# Patient Record
Sex: FEMALE | Race: WHITE | NOT HISPANIC OR LATINO | Employment: FULL TIME | URBAN - METROPOLITAN AREA
[De-identification: names, ages, dates, MRNs, and addresses within clinical notes are randomized per-mention and may not be internally consistent; named-entity substitution may affect disease eponyms.]

---

## 2017-03-06 RX ORDER — RIZATRIPTAN BENZOATE 10 MG/1
TABLET, ORALLY DISINTEGRATING ORAL
Qty: 12 TABLET | Refills: 0 | Status: SHIPPED | OUTPATIENT
Start: 2017-03-06 | End: 2017-03-10 | Stop reason: SDUPTHER

## 2017-03-09 ENCOUNTER — CONVERSION ENCOUNTER (OUTPATIENT)
Dept: FAMILY MEDICINE CLINIC | Facility: CLINIC | Age: 52
End: 2017-03-09

## 2017-03-09 LAB
ALBUMIN SERPL-MCNC: 4.5 G/DL (ref 3.6–5.1)
ALBUMIN/GLOB SERPL: ABNORMAL {RATIO} (ref 1–2.5)
ALP SERPL-CCNC: 120 UNITS/L (ref 33–130)
ALT SERPL-CCNC: 16 UNITS/L (ref 6–29)
AST SERPL-CCNC: 13 UNITS/L (ref 10–35)
BILIRUB SERPL-MCNC: 0.3 MG/DL (ref 0.2–1.2)
BUN SERPL-MCNC: 23 MG/DL (ref 7–25)
BUN/CREAT SERPL: ABNORMAL (ref 6–22)
CALCIUM SERPL-MCNC: 9.3 MG/DL (ref 8.6–10.4)
CHLORIDE SERPL-SCNC: 105 MMOL/L (ref 98–110)
CHOLEST SERPL-MCNC: 176 MG/DL (ref 125–200)
CHOLEST/HDLC SERPL: ABNORMAL {RATIO}
CO2 CONTENT VENOUS: 25 MMOL/L (ref 20–31)
CONV TOTAL PROTEIN: 7.2 G/DL (ref 6.1–8.1)
CREAT UR-MCNC: 1.14 MG/DL (ref 0.5–1.05)
GLOBULIN UR ELPH-MCNC: ABNORMAL G/DL (ref 1.9–3.7)
GLUCOSE SERPL-MCNC: 94 MG/DL (ref 65–99)
HDLC SERPL-MCNC: 40 MG/DL
LDLC SERPL CALC-MCNC: ABNORMAL MG/DL
POTASSIUM SERPL-SCNC: 4.3 MMOL/L (ref 3.5–5.3)
SODIUM SERPL-SCNC: 138 MMOL/L (ref 135–146)
TRIGL SERPL-MCNC: 456 MG/DL
TSH SERPL-ACNC: 1.05 MICROINTL UNITS/ML

## 2017-03-10 ENCOUNTER — OFFICE VISIT (OUTPATIENT)
Dept: NEUROLOGY | Facility: CLINIC | Age: 52
End: 2017-03-10

## 2017-03-10 VITALS
HEIGHT: 66 IN | SYSTOLIC BLOOD PRESSURE: 121 MMHG | DIASTOLIC BLOOD PRESSURE: 78 MMHG | BODY MASS INDEX: 28.93 KG/M2 | WEIGHT: 180 LBS

## 2017-03-10 DIAGNOSIS — G43.009 MIGRAINE WITHOUT AURA AND WITHOUT STATUS MIGRAINOSUS, NOT INTRACTABLE: Primary | ICD-10-CM

## 2017-03-10 PROCEDURE — 99213 OFFICE O/P EST LOW 20 MIN: CPT | Performed by: PSYCHIATRY & NEUROLOGY

## 2017-03-10 RX ORDER — RIZATRIPTAN BENZOATE 10 MG/1
10 TABLET, ORALLY DISINTEGRATING ORAL ONCE AS NEEDED
Qty: 12 TABLET | Refills: 11 | Status: SHIPPED | OUTPATIENT
Start: 2017-03-10 | End: 2020-06-01

## 2017-03-10 RX ORDER — TOPIRAMATE 50 MG/1
50 TABLET, FILM COATED ORAL NIGHTLY
Qty: 30 TABLET | Refills: 11 | Status: SHIPPED | OUTPATIENT
Start: 2017-03-10 | End: 2018-03-10

## 2017-03-10 RX ORDER — RIZATRIPTAN BENZOATE 10 MG/1
10 TABLET ORAL ONCE AS NEEDED
Qty: 12 TABLET | Refills: 11 | Status: SHIPPED | OUTPATIENT
Start: 2017-03-10 | End: 2023-02-16

## 2017-03-10 RX ORDER — RIZATRIPTAN BENZOATE 10 MG/1
10 TABLET, ORALLY DISINTEGRATING ORAL ONCE AS NEEDED
Qty: 9 TABLET | Refills: 11 | Status: SHIPPED | OUTPATIENT
Start: 2017-03-10 | End: 2017-03-10 | Stop reason: SDUPTHER

## 2017-03-10 NOTE — PROGRESS NOTES
Subjective:     Patient ID: Betty Dunham is a 51 y.o. female.    History of Present Illness     Patient's headaches have worsened in intensity and frequency.  Maxalt seems to work fairly well.  The following portions of the patient's history were reviewed and updated as appropriate: allergies, current medications, past family history, past medical history, past social history, past surgical history and problem list.      Current Outpatient Prescriptions:   •  rizatriptan MLT (MAXALT-MLT) 10 MG disintegrating tablet, Take 1 tablet by mouth 1 (One) Time As Needed for migraine for up to 1 dose. May repeat in 2 hours if needed, Disp: 12 tablet, Rfl: 11  •  rizatriptan (MAXALT) 10 MG tablet, Take 1 tablet by mouth 1 (One) Time As Needed for migraine. May repeat in 2 hours if needed, Disp: 12 tablet, Rfl: 11  •  topiramate (TOPAMAX) 50 MG tablet, Take 1 tablet by mouth Every Night., Disp: 30 tablet, Rfl: 11    Review of Systems   Constitutional: Negative.    Neurological: Positive for headaches. Negative for dizziness, tremors, seizures, syncope, facial asymmetry, speech difficulty, weakness, light-headedness and numbness.   Psychiatric/Behavioral: Negative.         Objective:    Neurologic Exam  Mental status examination was appropriate.  Funduscopy, visual fields, eye movements and pupillary reflexes were normal.  No facial weakness was noted.  Gait was normal.  No pattern of focal weakness was noted.  Physical Exam    Assessment/Plan:     Betty was seen today for migraine.    Diagnoses and all orders for this visit:    Migraine without aura and without status migrainosus, not intractable    Other orders  -     Discontinue: rizatriptan MLT (MAXALT-MLT) 10 MG disintegrating tablet; Take 1 tablet by mouth 1 (One) Time As Needed for migraine for up to 1 dose. May repeat in 2 hours if needed  -     topiramate (TOPAMAX) 50 MG tablet; Take 1 tablet by mouth Every Night.  -     rizatriptan MLT (MAXALT-MLT) 10 MG  disintegrating tablet; Take 1 tablet by mouth 1 (One) Time As Needed for migraine for up to 1 dose. May repeat in 2 hours if needed  -     rizatriptan (MAXALT) 10 MG tablet; Take 1 tablet by mouth 1 (One) Time As Needed for migraine. May repeat in 2 hours if needed       Prescription drug management - change from Maxalt MLT to regular Maxalt 10 mg.  As her headaches are worse added Topamax 50 mg at night.  She's been on this in the past.    Phone follow-up in one month.  Follow-up in the office in one year.Thank you for allowing me to share in the care of this patient.  Marco A Ron M.D.

## 2018-04-10 RX ORDER — RIZATRIPTAN BENZOATE 10 MG/1
TABLET, ORALLY DISINTEGRATING ORAL
Qty: 12 TABLET | OUTPATIENT
Start: 2018-04-10

## 2019-04-16 ENCOUNTER — HOSPITAL ENCOUNTER (OUTPATIENT)
Dept: FAMILY MEDICINE CLINIC | Facility: CLINIC | Age: 54
Setting detail: SPECIMEN
Discharge: HOME | End: 2019-04-16
Attending: INTERNAL MEDICINE | Admitting: INTERNAL MEDICINE

## 2019-04-16 LAB
ALBUMIN SERPL-MCNC: 4.3 G/DL (ref 3.5–4.8)
ALBUMIN/GLOB SERPL: 1.5 {RATIO} (ref 1–1.7)
ALP SERPL-CCNC: 92 IU/L (ref 32–91)
ALT SERPL-CCNC: 23 IU/L (ref 14–54)
ANION GAP SERPL CALC-SCNC: 16.7 MMOL/L (ref 10–20)
AST SERPL-CCNC: 20 IU/L (ref 15–41)
BASOPHILS # BLD AUTO: 0 10*3/UL (ref 0–0.2)
BASOPHILS NFR BLD AUTO: 1 % (ref 0–2)
BILIRUB SERPL-MCNC: 1 MG/DL (ref 0.3–1.2)
BUN SERPL-MCNC: 19 MG/DL (ref 8–20)
BUN/CREAT SERPL: 13.6 (ref 5.4–26.2)
CALCIUM SERPL-MCNC: 9.3 MG/DL (ref 8.9–10.3)
CHLORIDE SERPL-SCNC: 104 MMOL/L (ref 101–111)
CHOLEST SERPL-MCNC: 150 MG/DL
CHOLEST/HDLC SERPL: 3.3 {RATIO}
CONV CO2: 23 MMOL/L (ref 22–32)
CONV LDL CHOLESTEROL DIRECT: 81 MG/DL (ref 0–100)
CONV TOTAL PROTEIN: 7.1 G/DL (ref 6.1–7.9)
CREAT UR-MCNC: 1.4 MG/DL (ref 0.4–1)
DIFFERENTIAL METHOD BLD: (no result)
EOSINOPHIL # BLD AUTO: 0.2 10*3/UL (ref 0–0.3)
EOSINOPHIL # BLD AUTO: 3 % (ref 0–3)
ERYTHROCYTE [DISTWIDTH] IN BLOOD BY AUTOMATED COUNT: 12.8 % (ref 11.5–14.5)
GLOBULIN UR ELPH-MCNC: 2.8 G/DL (ref 2.5–3.8)
GLUCOSE SERPL-MCNC: 118 MG/DL (ref 65–99)
HCT VFR BLD AUTO: 42.7 % (ref 35–49)
HDLC SERPL-MCNC: 46 MG/DL
HGB BLD-MCNC: 14.3 G/DL (ref 12–15)
LDLC/HDLC SERPL: 1.8 {RATIO}
LIPID INTERPRETATION: ABNORMAL
LYMPHOCYTES # BLD AUTO: 1.8 10*3/UL (ref 0.8–4.8)
LYMPHOCYTES NFR BLD AUTO: 35 % (ref 18–42)
MCH RBC QN AUTO: 30.4 PG (ref 26–32)
MCHC RBC AUTO-ENTMCNC: 33.4 G/DL (ref 32–36)
MCV RBC AUTO: 90.8 FL (ref 80–94)
MONOCYTES # BLD AUTO: 0.3 10*3/UL (ref 0.1–1.3)
MONOCYTES NFR BLD AUTO: 5 % (ref 2–11)
NEUTROPHILS # BLD AUTO: 3 10*3/UL (ref 2.3–8.6)
NEUTROPHILS NFR BLD AUTO: 56 % (ref 50–75)
NRBC BLD AUTO-RTO: 0 /100{WBCS}
NRBC/RBC NFR BLD MANUAL: 0 10*3/UL
PLATELET # BLD AUTO: 113 10*3/UL (ref 150–450)
PMV BLD AUTO: 8.9 FL (ref 7.4–10.4)
POTASSIUM SERPL-SCNC: 4.7 MMOL/L (ref 3.6–5.1)
RBC # BLD AUTO: 4.7 10*6/UL (ref 4–5.4)
SODIUM SERPL-SCNC: 139 MMOL/L (ref 136–144)
TRIGL SERPL-MCNC: 128 MG/DL
VLDLC SERPL CALC-MCNC: 23.1 MG/DL
WBC # BLD AUTO: 5.3 10*3/UL (ref 4.5–11.5)

## 2019-12-10 RX ORDER — GUAIFENESIN 600 MG/1
1200 TABLET, EXTENDED RELEASE ORAL 2 TIMES DAILY
COMMUNITY
End: 2020-06-26

## 2019-12-10 RX ORDER — AMOXICILLIN AND CLAVULANATE POTASSIUM 875; 125 MG/1; MG/1
1 TABLET, FILM COATED ORAL 2 TIMES DAILY
COMMUNITY
End: 2020-06-26

## 2019-12-13 ENCOUNTER — ANESTHESIA EVENT (OUTPATIENT)
Dept: GASTROENTEROLOGY | Facility: HOSPITAL | Age: 54
End: 2019-12-13

## 2019-12-16 ENCOUNTER — ANESTHESIA (OUTPATIENT)
Dept: GASTROENTEROLOGY | Facility: HOSPITAL | Age: 54
End: 2019-12-16

## 2019-12-16 ENCOUNTER — HOSPITAL ENCOUNTER (OUTPATIENT)
Facility: HOSPITAL | Age: 54
Setting detail: HOSPITAL OUTPATIENT SURGERY
Discharge: HOME | End: 2019-12-16
Attending: INTERNAL MEDICINE | Admitting: INTERNAL MEDICINE

## 2019-12-16 ENCOUNTER — ON CAMPUS - OUTPATIENT (OUTPATIENT)
Dept: URBAN - METROPOLITAN AREA HOSPITAL 85 | Facility: HOSPITAL | Age: 54
End: 2019-12-16
Payer: COMMERCIAL

## 2019-12-16 VITALS
DIASTOLIC BLOOD PRESSURE: 79 MMHG | HEART RATE: 75 BPM | HEIGHT: 65 IN | RESPIRATION RATE: 20 BRPM | SYSTOLIC BLOOD PRESSURE: 120 MMHG | OXYGEN SATURATION: 100 % | WEIGHT: 199.52 LBS | BODY MASS INDEX: 33.24 KG/M2 | TEMPERATURE: 97.9 F

## 2019-12-16 DIAGNOSIS — D12.3 BENIGN NEOPLASM OF TRANSVERSE COLON: ICD-10-CM

## 2019-12-16 DIAGNOSIS — Z12.11 SCREENING FOR COLON CANCER: ICD-10-CM

## 2019-12-16 DIAGNOSIS — Z12.11 ENCOUNTER FOR SCREENING FOR MALIGNANT NEOPLASM OF COLON: ICD-10-CM

## 2019-12-16 PROCEDURE — 25010000002 ONDANSETRON PER 1 MG: Performed by: INTERNAL MEDICINE

## 2019-12-16 PROCEDURE — 25010000002 PROPOFOL 10 MG/ML EMULSION: Performed by: ANESTHESIOLOGY

## 2019-12-16 PROCEDURE — 88305 TISSUE EXAM BY PATHOLOGIST: CPT | Performed by: INTERNAL MEDICINE

## 2019-12-16 PROCEDURE — 45385 COLONOSCOPY W/LESION REMOVAL: CPT | Performed by: INTERNAL MEDICINE

## 2019-12-16 RX ORDER — SODIUM CHLORIDE 0.9 % (FLUSH) 0.9 %
10 SYRINGE (ML) INJECTION AS NEEDED
Status: CANCELLED | OUTPATIENT
Start: 2019-12-16

## 2019-12-16 RX ORDER — SODIUM CHLORIDE 9 MG/ML
9 INJECTION, SOLUTION INTRAVENOUS CONTINUOUS PRN
Status: DISCONTINUED | OUTPATIENT
Start: 2019-12-16 | End: 2019-12-16 | Stop reason: HOSPADM

## 2019-12-16 RX ORDER — SODIUM CHLORIDE 0.9 % (FLUSH) 0.9 %
10 SYRINGE (ML) INJECTION EVERY 12 HOURS SCHEDULED
Status: DISCONTINUED | OUTPATIENT
Start: 2019-12-16 | End: 2019-12-16 | Stop reason: HOSPADM

## 2019-12-16 RX ORDER — LIDOCAINE HYDROCHLORIDE 10 MG/ML
INJECTION, SOLUTION EPIDURAL; INFILTRATION; INTRACAUDAL; PERINEURAL AS NEEDED
Status: DISCONTINUED | OUTPATIENT
Start: 2019-12-16 | End: 2019-12-16 | Stop reason: SURG

## 2019-12-16 RX ORDER — SODIUM CHLORIDE 0.9 % (FLUSH) 0.9 %
10 SYRINGE (ML) INJECTION AS NEEDED
Status: DISCONTINUED | OUTPATIENT
Start: 2019-12-16 | End: 2019-12-16 | Stop reason: HOSPADM

## 2019-12-16 RX ORDER — ONDANSETRON 2 MG/ML
4 INJECTION INTRAMUSCULAR; INTRAVENOUS ONCE AS NEEDED
Status: COMPLETED | OUTPATIENT
Start: 2019-12-16 | End: 2019-12-16

## 2019-12-16 RX ORDER — ONDANSETRON 2 MG/ML
4 INJECTION INTRAMUSCULAR; INTRAVENOUS EVERY 6 HOURS PRN
Status: CANCELLED | OUTPATIENT
Start: 2019-12-16

## 2019-12-16 RX ORDER — ONDANSETRON 4 MG/1
4 TABLET, FILM COATED ORAL EVERY 6 HOURS PRN
Status: CANCELLED | OUTPATIENT
Start: 2019-12-16

## 2019-12-16 RX ORDER — MEPERIDINE HYDROCHLORIDE 25 MG/ML
50 INJECTION INTRAMUSCULAR; INTRAVENOUS; SUBCUTANEOUS EVERY 6 HOURS PRN
Status: DISCONTINUED | OUTPATIENT
Start: 2019-12-16 | End: 2019-12-16 | Stop reason: HOSPADM

## 2019-12-16 RX ORDER — MAGNESIUM CARB/ALUMINUM HYDROX 105-160MG
296 TABLET,CHEWABLE ORAL ONCE
Status: DISCONTINUED | OUTPATIENT
Start: 2019-12-16 | End: 2019-12-16 | Stop reason: HOSPADM

## 2019-12-16 RX ORDER — SODIUM CHLORIDE 0.9 % (FLUSH) 0.9 %
3 SYRINGE (ML) INJECTION EVERY 12 HOURS SCHEDULED
Status: CANCELLED | OUTPATIENT
Start: 2019-12-16

## 2019-12-16 RX ORDER — PROPOFOL 10 MG/ML
VIAL (ML) INTRAVENOUS AS NEEDED
Status: DISCONTINUED | OUTPATIENT
Start: 2019-12-16 | End: 2019-12-16 | Stop reason: SURG

## 2019-12-16 RX ADMIN — PROPOFOL 30 MG: 10 INJECTION, EMULSION INTRAVENOUS at 10:37

## 2019-12-16 RX ADMIN — ONDANSETRON 8 MG: 2 INJECTION INTRAMUSCULAR; INTRAVENOUS at 11:14

## 2019-12-16 RX ADMIN — PROPOFOL 40 MG: 10 INJECTION, EMULSION INTRAVENOUS at 10:24

## 2019-12-16 RX ADMIN — MEPERIDINE HYDROCHLORIDE 25 MG: 25 INJECTION INTRAMUSCULAR; INTRAVENOUS; SUBCUTANEOUS at 11:43

## 2019-12-16 RX ADMIN — SODIUM CHLORIDE 9 ML/HR: 900 INJECTION, SOLUTION INTRAVENOUS at 10:12

## 2019-12-16 RX ADMIN — PROPOFOL 40 MG: 10 INJECTION, EMULSION INTRAVENOUS at 10:30

## 2019-12-16 RX ADMIN — PROPOFOL 100 MG: 10 INJECTION, EMULSION INTRAVENOUS at 10:22

## 2019-12-16 RX ADMIN — PROPOFOL 40 MG: 10 INJECTION, EMULSION INTRAVENOUS at 10:25

## 2019-12-16 RX ADMIN — PROPOFOL 40 MG: 10 INJECTION, EMULSION INTRAVENOUS at 10:34

## 2019-12-16 RX ADMIN — LIDOCAINE HYDROCHLORIDE 40 MG: 10 INJECTION, SOLUTION EPIDURAL; INFILTRATION; INTRACAUDAL; PERINEURAL at 10:22

## 2019-12-16 RX ADMIN — PROPOFOL 40 MG: 10 INJECTION, EMULSION INTRAVENOUS at 10:28

## 2019-12-16 RX ADMIN — PROPOFOL 40 MG: 10 INJECTION, EMULSION INTRAVENOUS at 10:23

## 2019-12-16 NOTE — NURSING NOTE
Pt sats dropped to 88-89 while dozing. States has sleep apnea. Applied oxygen per nasal cannula at 2 liters as per standing orders. Pts sats jose maria to 96%. Resting at intervals.

## 2019-12-16 NOTE — H&P
GI CONSULT  NOTE:    Referring Provider:    Sofia Whipple MD  [unfilled]    Chief complaint: <principal problem not specified>    Subjective .   Screening colonoscopy  History of present illness:      Betty Dunham is a 54 y.o. female who presents today for Procedure(s):  COLONOSCOPY for the indications listed below.     The updated Patient Profile was reviewed prior to the procedure, in conjunction with the Physical Exam, including medical conditions, surgical procedures, medications, allergies, family history and social history.     Pre-operatively, I reviewed the indication(s) for the procedure, the risks of the procedure [including but not limited to: unexpected bleeding possibly requiring hospitalization and/or unplanned repeat procedures, perforation possibly requiring surgical treatment, missed lesions and complications of sedation/MAC (also explained by anesthesia staff)].     I have evaluated the patient for risks associated with the planned anesthesia and the procedure to be performed and find the patient an acceptable candidate for IV sedation.    Multiple opportunities were provided for any questions or concerns, and all questions were answered satisfactorily before any anesthesia was administered. We will proceed with the planned procedure.    Past Medical History:  Past Medical History:   Diagnosis Date   • Arthritis    • Migraine    • Sleep apnea        Past Surgical History:  Past Surgical History:   Procedure Laterality Date   • APPENDECTOMY     •  SECTION     • HYSTERECTOMY     • KNEE ARTHROSCOPY Right    • NO PAST SURGERIES         Social History:  Social History     Tobacco Use   • Smoking status: Former Smoker   Substance Use Topics   • Alcohol use: Yes     Comment: 3 times per year   • Drug use: No       Family History:  Family History   Problem Relation Age of Onset   • Parkinsonism Father        Medications:  No medications prior to admission.       Scheduled  "Meds:  Continuous Infusions:  No current facility-administered medications for this encounter.   PRN Meds:.    ALLERGIES:  Patient has no known allergies.    ROS:  The following systems were reviewed and negative;   Constitution:  No fevers, chills, no unintentional weight loss  Skin: no rash, no jaundice  Eyes:  No blurry vision, no eye pain  HENT:  No change in hearing or smell  Resp:  No dyspnea or cough  CV:  No chest pain or palpitations  :  No dysuria, hematuria  Musculoskeletal:  No leg cramps or arthralgias  Neuro:  No tremor, no numbness  Psych:  No depression or confsuion    Objective     Vital Signs:   Vitals:    12/10/19 1617   Weight: 81.6 kg (180 lb)   Height: 165.1 cm (65\")       Physical Exam:       General Appearance:    Awake and alert, in no acute distress   Head:    Normocephalic, without obvious abnormality, atraumatic   Throat:   No oral lesions, no thrush, oral mucosa moist   Lungs:     respirations regular, even and unlabored   Skin:   No rash, no jaundice       Results Review:  Lab Results (last 24 hours)     ** No results found for the last 24 hours. **          Imaging Results (Last 24 Hours)     ** No results found for the last 24 hours. **           I reviewed the patient's labs and imaging.    ASSESSMENT AND PLAN:      Active Problems:    * No active hospital problems. *  Screening colonoscopy    Procedure(s):  COLONOSCOPY      I discussed the patients findings and my recommendations with the patient.    Dominic Cameron MD  12/16/19  8:18 AM  "

## 2019-12-16 NOTE — NURSING NOTE
Pt complaining of nausea and abd pain. Informed dr Gutiérrez who spoke with pt and ordered 8 mg iv zofran which was given. Pt states nausea lessened but pain still present. Notified Dr Murray

## 2019-12-16 NOTE — NURSING NOTE
Pt states pain is much better, about a 1. Discharge instructions given, pt nausea has abated as well. Discharged home with son.

## 2019-12-16 NOTE — NURSING NOTE
"Assisted pt to walk half of hallway and then to bathroom where she was able to pass \"a little gas\". Stated pain still at 6-7. Dr Murray notified. Dr Murray ordered to start with 25 mg demerol and if no relief in 30 minutes to administer 25 mg more.  Given 25 mg iv and flushed after, good blood return. Pt stating she is getting relief after 5 minutes.  "

## 2019-12-16 NOTE — DISCHARGE INSTRUCTIONS
A responsible adult should stay with you and you should rest quietly for the rest of the day.    Do not drink alcohol, drive, operate any heavy machinery or power tools or make any legal/important decisions for the next 24 hours.     Progress your diet as tolerated.  If you begin to experience severe pain, increased shortness of breath, racing heartbeat or a fever above 101 F, seek immediate medical attention.     You had polyps removed. You may see a few drops of blood but if bleeding is more than a teaspoonful go to the emergency room.    Follow up with MD as instructed. Call office for results in 3 to 5 days if needed.

## 2019-12-16 NOTE — ANESTHESIA POSTPROCEDURE EVALUATION
Patient: Betty Dunham    Procedure Summary     Date:  12/16/19 Room / Location:  [facility] ENDOSCOPY 4 / [facility] ENDOSCOPY    Anesthesia Start:  1018 Anesthesia Stop:  1052    Procedure:  COLONOSCOPY with polypectomy x2 (N/A ) Diagnosis:  (Colon Cancer Screening)    Surgeon:  Dominic Cameron MD Provider:  Yehuda Miller MD    Anesthesia Type:  MAC ASA Status:  2          Anesthesia Type: MAC    Vitals  Vitals Value Taken Time   /76 12/16/2019 11:23 AM   Temp     Pulse 70 12/16/2019 11:26 AM   Resp 12 12/16/2019 11:23 AM   SpO2 100 % 12/16/2019 11:26 AM   Vitals shown include unvalidated device data.        Post Anesthesia Care and Evaluation    Patient location during evaluation: PACU  Patient participation: complete - patient participated  Level of consciousness: awake  Pain scale: See nurse's notes for pain score.  Pain management: adequate  Airway patency: patent  Anesthetic complications: No anesthetic complications  PONV Status: none  Cardiovascular status: acceptable  Respiratory status: acceptable  Hydration status: acceptable    Comments: Patient seen and examined postoperatively; vital signs stable; SpO2 greater than or equal to 90%; cardiopulmonary status stable; nausea/vomiting adequately controlled; pain adequately controlled; no apparent anesthesia complications; patient discharged from anesthesia care when discharge criteria were met

## 2019-12-16 NOTE — OP NOTE
COLONOSCOPY Procedure Report    Patient Name:  Betty Dunham  YOB: 1965    Date of Surgery:  12/16/2019     Pre-Op Diagnosis:  Colon Cancer Screening       Post-Op Diagnosis Codes:  2 polyps removed via cold snare polypectomy in the transverse colon      Procedure/CPT® Codes:      Procedure(s):  COLONOSCOPY with polypectomy x2    Staff:  Surgeon(s):  Dominic Cameron MD      Anesthesia: Monitored Anesthesia Care    Description of Procedure:  A description of the procedure as well as risks, benefits and alternative methods were explained to the patient who voiced understanding and signed the corresponding consent form. A physical exam was performed and vital signs were monitored throughout the procedure.    A rectal exam was performed which was normal. An Olympus colonoscope was placed into the rectum and proceeded under direct visualization through the colon until the cecum and appendiceal orifice were identified. Careful visualization occurred upon slow withdraw of the scope. The scope was then retroflexed and the distal rectum was visualized. The quality of the prep was good. The procedure was moderately difficult due to loop formation and there were no immediate complications.    Findings:  2 polyps removed via cold snare polypectomy in the transverse colon    Impression:  Screening colonoscopy with 2 polyps removed    Recommendations:  Follow-up biopsy results  Repeat colonoscopy in 5 years if polyps are adenomatous otherwise 10 years      Dominic Cameron MD     Date: 12/16/2019    Time: 11:00 AM

## 2019-12-16 NOTE — ANESTHESIA PREPROCEDURE EVALUATION
Anesthesia Evaluation     Patient summary reviewed   NPO Solid Status: > 8 hours  NPO Liquid Status: > 8 hours           Airway   Mallampati: II  TM distance: >3 FB  Neck ROM: full  No difficulty expected  Dental - normal exam     Pulmonary - normal exam   (+) sleep apnea,   Cardiovascular - normal exam  Exercise tolerance: good (4-7 METS)    ECG reviewed        Neuro/Psych  (+) headaches,     GI/Hepatic/Renal/Endo      Musculoskeletal     Abdominal  - normal exam    Bowel sounds: normal.   Substance History      OB/GYN          Other   arthritis,                      Anesthesia Plan    ASA 2     MAC     intravenous induction     Anesthetic plan, all risks, benefits, and alternatives have been provided, discussed and informed consent has been obtained with: patient.

## 2019-12-17 LAB
LAB AP CASE REPORT: NORMAL
PATH REPORT.FINAL DX SPEC: NORMAL
PATH REPORT.GROSS SPEC: NORMAL

## 2020-04-27 RX ORDER — CELECOXIB 200 MG/1
CAPSULE ORAL
Qty: 30 CAPSULE | Refills: 7 | Status: SHIPPED | OUTPATIENT
Start: 2020-04-27 | End: 2020-07-21

## 2020-06-01 RX ORDER — RIZATRIPTAN BENZOATE 10 MG/1
TABLET, ORALLY DISINTEGRATING ORAL
Qty: 12 TABLET | Refills: 12 | Status: SHIPPED | OUTPATIENT
Start: 2020-06-01 | End: 2020-06-26 | Stop reason: SDUPTHER

## 2020-06-26 ENCOUNTER — OFFICE VISIT (OUTPATIENT)
Dept: FAMILY MEDICINE CLINIC | Facility: CLINIC | Age: 55
End: 2020-06-26

## 2020-06-26 VITALS
SYSTOLIC BLOOD PRESSURE: 131 MMHG | DIASTOLIC BLOOD PRESSURE: 83 MMHG | WEIGHT: 212.6 LBS | HEART RATE: 94 BPM | RESPIRATION RATE: 12 BRPM | BODY MASS INDEX: 35.38 KG/M2 | TEMPERATURE: 98.2 F

## 2020-06-26 DIAGNOSIS — Z12.39 SCREENING FOR BREAST CANCER: ICD-10-CM

## 2020-06-26 DIAGNOSIS — M54.50 ACUTE MIDLINE LOW BACK PAIN WITHOUT SCIATICA: Primary | ICD-10-CM

## 2020-06-26 DIAGNOSIS — G43.009 MIGRAINE WITHOUT AURA AND WITHOUT STATUS MIGRAINOSUS, NOT INTRACTABLE: ICD-10-CM

## 2020-06-26 LAB
BILIRUB BLD-MCNC: NEGATIVE MG/DL
CLARITY, POC: CLEAR
COLOR UR: YELLOW
GLUCOSE UR STRIP-MCNC: NEGATIVE MG/DL
KETONES UR QL: NEGATIVE
LEUKOCYTE EST, POC: NEGATIVE
NITRITE UR-MCNC: NEGATIVE MG/ML
PH UR: 5.5 [PH] (ref 5–8)
PROT UR STRIP-MCNC: NEGATIVE MG/DL
RBC # UR STRIP: NEGATIVE /UL
SP GR UR: 1.01 (ref 1–1.03)
UROBILINOGEN UR QL: NORMAL

## 2020-06-26 PROCEDURE — 96372 THER/PROPH/DIAG INJ SC/IM: CPT | Performed by: INTERNAL MEDICINE

## 2020-06-26 PROCEDURE — 81003 URINALYSIS AUTO W/O SCOPE: CPT | Performed by: INTERNAL MEDICINE

## 2020-06-26 PROCEDURE — 99214 OFFICE O/P EST MOD 30 MIN: CPT | Performed by: INTERNAL MEDICINE

## 2020-06-26 RX ORDER — RIZATRIPTAN BENZOATE 10 MG/1
10 TABLET, ORALLY DISINTEGRATING ORAL ONCE AS NEEDED
Qty: 12 TABLET | Refills: 12 | Status: SHIPPED | OUTPATIENT
Start: 2020-06-26 | End: 2021-07-16

## 2020-06-26 RX ORDER — METHYLPREDNISOLONE ACETATE 80 MG/ML
80 INJECTION, SUSPENSION INTRA-ARTICULAR; INTRALESIONAL; INTRAMUSCULAR; SOFT TISSUE ONCE
Status: COMPLETED | OUTPATIENT
Start: 2020-06-26 | End: 2020-06-26

## 2020-06-26 RX ADMIN — METHYLPREDNISOLONE ACETATE 80 MG: 80 INJECTION, SUSPENSION INTRA-ARTICULAR; INTRALESIONAL; INTRAMUSCULAR; SOFT TISSUE at 17:27

## 2020-06-26 NOTE — PROGRESS NOTES
Rooming Tab(CC,VS,Pt Hx,Fall Screen)  Chief Complaint   Patient presents with   • Back Pain       Subjective   Pt here for lower back--  Worried that it is UTI been on horse every day for last week   Knee getting worse is on celebrex and getting shots- very stiff see ortho regularly- doesn't want replacement yet  needs refill  On maxalt , needs mammogram and asking if could just get physical today    I have reviewed and updated her medications, medical history and problem list during today's office visit.     Patient Care Team:  Sofia Whipple MD as PCP - General    Problem List Tab  Medications Tab  Synopsis Tab  Chart Review Tab  Care Everywhere Tab  Immunizations Tab  Patient History Tab    Social History     Tobacco Use   • Smoking status: Former Smoker   Substance Use Topics   • Alcohol use: Yes     Comment: 3 times per year       Review of Systems   Constitutional: Negative for fatigue and fever.   HENT: Negative for congestion.    Respiratory: Negative for apnea, cough and wheezing.    Cardiovascular: Negative for chest pain.   Gastrointestinal: Negative for abdominal distention.   Musculoskeletal: Positive for arthralgias, back pain and gait problem.   Neurological: Positive for headache. Negative for syncope.   Psychiatric/Behavioral: Positive for sleep disturbance. Negative for behavioral problems.       Objective     Rooming Tab(CC,VS,Pt Hx,Fall Screen)  /83 (BP Location: Left arm, Patient Position: Sitting, Cuff Size: Large Adult)   Pulse 94   Temp 98.2 °F (36.8 °C) (Tympanic)   Resp 12   Wt 96.4 kg (212 lb 9.6 oz)   BMI 35.38 kg/m²     Body mass index is 35.38 kg/m².    Physical Exam   Constitutional: She is oriented to person, place, and time. She appears well-developed and well-nourished.   HENT:   Head: Normocephalic and atraumatic.   Right Ear: Tympanic membrane normal.   Left Ear: Tympanic membrane normal.   Eyes: Pupils are equal, round, and reactive to light.   Neck: Normal  range of motion. Neck supple.   Cardiovascular: Normal rate and regular rhythm.   No murmur heard.  Pulmonary/Chest: Effort normal and breath sounds normal.   Abdominal: Soft. Bowel sounds are normal. She exhibits no distension.   Musculoskeletal: She exhibits tenderness.   Lumbar diffuse tenderness- negative straight leg   Neurological: She is oriented to person, place, and time.   Skin: Capillary refill takes less than 2 seconds.   Psychiatric: She has a normal mood and affect.   Nursing note and vitals reviewed.       Statin Choice Calculator  Data Reviewed:                   Assessment/Plan   Order Review Tab  Health Maintenance Tab  Patient Plan/Order Tab  Diagnoses and all orders for this visit:    1. Encounter for general adult medical examination without abnormal findings (Primary)  Assessment & Plan:  Discussed all recommendations      2. Migraine without aura and without status migrainosus, not intractable  -     rizatriptan MLT (MAXALT-MLT) 10 MG disintegrating tablet; Place 1 tablet on the tongue 1 (One) Time As Needed for Migraine for up to 1 dose. May repeat in 2 hours if needed  Dispense: 12 tablet; Refill: 12    3. Screening for breast cancer  Comments:  mammogram order sent    Orders:  -     Mammo Screening Digital Tomosynthesis Bilateral With CAD; Future    4. Acute midline low back pain without sciatica  Comments:  tolerated injection well- will change from celebrex if not helping  Orders:  -     methylPREDNISolone acetate (DEPO-medrol) injection 80 mg  -     POCT urinalysis dipstick, multipro      Wrapup Tab  Return if symptoms worsen or fail to improve, for Annual physical.      During this visit for their annual exam, we reviewed their personal history, social history and family history.  We went over their medications and all the recommended health maintenence items for their age group. They were given the opportunity to ask questions and discuss other concerns.

## 2020-06-27 PROBLEM — Z12.39 SCREENING FOR BREAST CANCER: Status: ACTIVE | Noted: 2020-06-27

## 2020-06-27 PROBLEM — M54.50 ACUTE MIDLINE LOW BACK PAIN WITHOUT SCIATICA: Status: ACTIVE | Noted: 2020-06-27

## 2020-06-27 PROBLEM — M15.9 POLYOSTEOARTHRITIS: Status: ACTIVE | Noted: 2019-04-16

## 2020-06-27 PROBLEM — R92.8 ABNORMAL MAMMOGRAM: Status: ACTIVE | Noted: 2019-06-03

## 2020-06-27 PROBLEM — Z00.00 ENCOUNTER FOR GENERAL ADULT MEDICAL EXAMINATION WITHOUT ABNORMAL FINDINGS: Status: ACTIVE | Noted: 2017-03-08

## 2020-07-21 ENCOUNTER — LAB (OUTPATIENT)
Dept: FAMILY MEDICINE CLINIC | Facility: CLINIC | Age: 55
End: 2020-07-21

## 2020-07-21 ENCOUNTER — OFFICE VISIT (OUTPATIENT)
Dept: FAMILY MEDICINE CLINIC | Facility: CLINIC | Age: 55
End: 2020-07-21

## 2020-07-21 VITALS
SYSTOLIC BLOOD PRESSURE: 130 MMHG | HEIGHT: 65 IN | TEMPERATURE: 98 F | HEART RATE: 77 BPM | DIASTOLIC BLOOD PRESSURE: 70 MMHG | RESPIRATION RATE: 16 BRPM | OXYGEN SATURATION: 99 % | BODY MASS INDEX: 35.49 KG/M2 | WEIGHT: 213 LBS

## 2020-07-21 DIAGNOSIS — M15.9 PRIMARY OSTEOARTHRITIS INVOLVING MULTIPLE JOINTS: ICD-10-CM

## 2020-07-21 DIAGNOSIS — E55.9 VITAMIN D DEFICIENCY: ICD-10-CM

## 2020-07-21 DIAGNOSIS — Z00.00 ENCOUNTER FOR GENERAL ADULT MEDICAL EXAMINATION WITHOUT ABNORMAL FINDINGS: Primary | ICD-10-CM

## 2020-07-21 DIAGNOSIS — G43.009 MIGRAINE WITHOUT AURA AND WITHOUT STATUS MIGRAINOSUS, NOT INTRACTABLE: ICD-10-CM

## 2020-07-21 PROBLEM — H43.399 VITREOUS FLOATERS: Status: ACTIVE | Noted: 2017-02-10

## 2020-07-21 PROCEDURE — 85025 COMPLETE CBC W/AUTO DIFF WBC: CPT | Performed by: INTERNAL MEDICINE

## 2020-07-21 PROCEDURE — 82306 VITAMIN D 25 HYDROXY: CPT | Performed by: INTERNAL MEDICINE

## 2020-07-21 PROCEDURE — 80061 LIPID PANEL: CPT | Performed by: INTERNAL MEDICINE

## 2020-07-21 PROCEDURE — 80053 COMPREHEN METABOLIC PANEL: CPT | Performed by: INTERNAL MEDICINE

## 2020-07-21 PROCEDURE — 99396 PREV VISIT EST AGE 40-64: CPT | Performed by: INTERNAL MEDICINE

## 2020-07-21 RX ORDER — DICLOFENAC SODIUM 75 MG/1
75 TABLET, DELAYED RELEASE ORAL 2 TIMES DAILY
Qty: 60 TABLET | Refills: 2 | Status: SHIPPED | OUTPATIENT
Start: 2020-07-21 | End: 2020-09-19

## 2020-07-21 RX ORDER — ERGOCALCIFEROL 1.25 MG/1
50000 CAPSULE ORAL WEEKLY
COMMUNITY
End: 2020-09-01

## 2020-07-21 NOTE — PROGRESS NOTES
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  OA getting worse and sees ortho and gets shots- when got shot in June for back helped knees greatly and still ok- stopped celebrex.      I have reviewed and updated her medications, medical history and problem list during today's office visit.     Patient Care Team:  Sofia Whipple MD as PCP - General    Problem List Tab  Medications Tab  Synopsis Tab  Chart Review Tab  Care Everywhere Tab  Immunizations Tab  Patient History Tab    Social History     Tobacco Use   • Smoking status: Former Smoker   • Smokeless tobacco: Never Used   • Tobacco comment: 24 yrs ago   Substance Use Topics   • Alcohol use: Yes     Comment: 3 times per year       Review of Systems   Constitutional: Negative for fatigue and fever.   HENT: Negative for congestion.    Respiratory: Negative for apnea, cough and wheezing.    Cardiovascular: Negative for chest pain.   Gastrointestinal: Negative for abdominal distention.   Musculoskeletal: Positive for arthralgias. Negative for gait problem.   Neurological: Negative for syncope.   Psychiatric/Behavioral: Positive for stress. Negative for behavioral problems and sleep disturbance.       Objective     Rooming Tab(CC,VS,Pt Hx,Fall Screen)  /70 (BP Location: Left arm, Patient Position: Sitting, Cuff Size: Large Adult)   Pulse 77   Temp 98 °F (36.7 °C) (Temporal)   Resp 16   Ht 165.1 cm (65\")   Wt 96.6 kg (213 lb)   SpO2 99%   BMI 35.45 kg/m²     Body mass index is 35.45 kg/m².    Physical Exam   Constitutional: She is oriented to person, place, and time. She appears well-developed and well-nourished.   HENT:   Head: Normocephalic and atraumatic.   Right Ear: Tympanic membrane normal.   Left Ear: Tympanic membrane normal.   Eyes: Pupils are equal, round, " and reactive to light.   Neck: Normal range of motion. Neck supple.   Cardiovascular: Normal rate and regular rhythm.   No murmur heard.  Pulmonary/Chest: Effort normal and breath sounds normal.   Abdominal: Soft. Bowel sounds are normal. She exhibits no distension.   Musculoskeletal:   Mild OA in knees   Neurological: She is oriented to person, place, and time.   Skin: Capillary refill takes less than 2 seconds.   Psychiatric: She has a normal mood and affect.   Nursing note and vitals reviewed.       Statin Choice Calculator  Data Reviewed:               Lab Results   Component Value Date    BUN 23 (H) 07/21/2020    CREATININE 1.23 (H) 07/21/2020    EGFRIFNONA 45 (L) 07/21/2020     07/21/2020    K 4.1 07/21/2020     07/21/2020    CALCIUM 9.5 07/21/2020    ALBUMIN 4.30 07/21/2020    BILITOT 0.2 07/21/2020    ALKPHOS 99 07/21/2020    AST 15 07/21/2020    ALT 18 07/21/2020    TRIG 156 (H) 07/21/2020    HDL 54 07/21/2020    VLDL 31.2 07/21/2020    LDL 94 07/21/2020    LDLHDL 1.74 07/21/2020    WBC 6.74 07/21/2020    RBC 4.33 07/21/2020    HCT 39.6 07/21/2020    MCV 91.5 07/21/2020    MCH 29.8 07/21/2020    DSYB78QA 55.7 07/21/2020      Assessment/Plan   Order Review Tab  Health Maintenance Tab  Patient Plan/Order Tab  Diagnoses and all orders for this visit:    1. Encounter for general adult medical examination without abnormal findings (Primary)  Comments:   discussed all reocmmendations  does not want vaccines today  Orders:  -     Lipid Panel  -     Comprehensive Metabolic Panel  -     CBC Auto Differential    2. Migraine without aura and without status migrainosus, not intractable    3. Primary osteoarthritis involving multiple joints  Comments:  will call when ready for NISAD- will do diclofenac    4. Vitamin D deficiency  -     Vitamin D 25 Hydroxy    Other orders  -     diclofenac (VOLTAREN) 75 MG EC tablet; Take 1 tablet by mouth 2 (Two) Times a Day for 60 days.  Dispense: 60 tablet; Refill:  2        Wrapup Tab  Return in about 1 year (around 7/21/2021), or if symptoms worsen or fail to improve, for Annual physical.        During this visit for their annual exam, we reviewed their personal history, social history and family history.  We went over their medications and all the recommended health maintenence items for their age group. They were given the opportunity to ask questions and discuss other concerns.

## 2020-07-22 PROBLEM — E55.9 VITAMIN D DEFICIENCY: Status: ACTIVE | Noted: 2020-07-22

## 2020-07-22 LAB
25(OH)D3 SERPL-MCNC: 55.7 NG/ML (ref 30–100)
ALBUMIN SERPL-MCNC: 4.3 G/DL (ref 3.5–5.2)
ALBUMIN/GLOB SERPL: 1.7 G/DL
ALP SERPL-CCNC: 99 U/L (ref 39–117)
ALT SERPL W P-5'-P-CCNC: 18 U/L (ref 1–33)
ANION GAP SERPL CALCULATED.3IONS-SCNC: 10.5 MMOL/L (ref 5–15)
AST SERPL-CCNC: 15 U/L (ref 1–32)
BASOPHILS # BLD AUTO: 0.03 10*3/MM3 (ref 0–0.2)
BASOPHILS NFR BLD AUTO: 0.4 % (ref 0–1.5)
BILIRUB SERPL-MCNC: 0.2 MG/DL (ref 0–1.2)
BUN SERPL-MCNC: 23 MG/DL (ref 6–20)
BUN/CREAT SERPL: 18.7 (ref 7–25)
CALCIUM SPEC-SCNC: 9.5 MG/DL (ref 8.6–10.5)
CHLORIDE SERPL-SCNC: 104 MMOL/L (ref 98–107)
CHOLEST SERPL-MCNC: 179 MG/DL (ref 0–200)
CO2 SERPL-SCNC: 25.5 MMOL/L (ref 22–29)
CREAT SERPL-MCNC: 1.23 MG/DL (ref 0.57–1)
DEPRECATED RDW RBC AUTO: 43.5 FL (ref 37–54)
EOSINOPHIL # BLD AUTO: 0.16 10*3/MM3 (ref 0–0.4)
EOSINOPHIL NFR BLD AUTO: 2.4 % (ref 0.3–6.2)
ERYTHROCYTE [DISTWIDTH] IN BLOOD BY AUTOMATED COUNT: 13 % (ref 12.3–15.4)
GFR SERPL CREATININE-BSD FRML MDRD: 45 ML/MIN/1.73
GLOBULIN UR ELPH-MCNC: 2.6 GM/DL
GLUCOSE SERPL-MCNC: 96 MG/DL (ref 65–99)
HCT VFR BLD AUTO: 39.6 % (ref 34–46.6)
HDLC SERPL-MCNC: 54 MG/DL (ref 40–60)
HGB BLD-MCNC: 12.9 G/DL (ref 12–15.9)
IMM GRANULOCYTES # BLD AUTO: 0.02 10*3/MM3 (ref 0–0.05)
IMM GRANULOCYTES NFR BLD AUTO: 0.3 % (ref 0–0.5)
LDLC SERPL CALC-MCNC: 94 MG/DL (ref 0–100)
LDLC/HDLC SERPL: 1.74 {RATIO}
LYMPHOCYTES # BLD AUTO: 2.21 10*3/MM3 (ref 0.7–3.1)
LYMPHOCYTES NFR BLD AUTO: 32.8 % (ref 19.6–45.3)
MCH RBC QN AUTO: 29.8 PG (ref 26.6–33)
MCHC RBC AUTO-ENTMCNC: 32.6 G/DL (ref 31.5–35.7)
MCV RBC AUTO: 91.5 FL (ref 79–97)
MONOCYTES # BLD AUTO: 0.43 10*3/MM3 (ref 0.1–0.9)
MONOCYTES NFR BLD AUTO: 6.4 % (ref 5–12)
NEUTROPHILS NFR BLD AUTO: 3.89 10*3/MM3 (ref 1.7–7)
NEUTROPHILS NFR BLD AUTO: 57.7 % (ref 42.7–76)
NRBC BLD AUTO-RTO: 0 /100 WBC (ref 0–0.2)
PLATELET # BLD AUTO: 193 10*3/MM3 (ref 140–450)
PMV BLD AUTO: 10 FL (ref 6–12)
POTASSIUM SERPL-SCNC: 4.1 MMOL/L (ref 3.5–5.2)
PROT SERPL-MCNC: 6.9 G/DL (ref 6–8.5)
RBC # BLD AUTO: 4.33 10*6/MM3 (ref 3.77–5.28)
SODIUM SERPL-SCNC: 140 MMOL/L (ref 136–145)
TRIGL SERPL-MCNC: 156 MG/DL (ref 0–150)
VLDLC SERPL-MCNC: 31.2 MG/DL (ref 5–40)
WBC # BLD AUTO: 6.74 10*3/MM3 (ref 3.4–10.8)

## 2020-08-12 DIAGNOSIS — Z12.39 SCREENING FOR BREAST CANCER: ICD-10-CM

## 2020-08-13 DIAGNOSIS — R92.8 ABNORMAL MAMMOGRAM: Primary | ICD-10-CM

## 2020-08-13 NOTE — PROGRESS NOTES
Please call the patient regarding her abnormal result. Needs right breast US and diagnostic- but left cyst on breast is stable

## 2020-08-29 ENCOUNTER — HOSPITAL ENCOUNTER (EMERGENCY)
Facility: HOSPITAL | Age: 55
Discharge: HOME | End: 2020-08-29
Attending: EMERGENCY MEDICINE | Admitting: EMERGENCY MEDICINE

## 2020-08-29 ENCOUNTER — APPOINTMENT (OUTPATIENT)
Dept: GENERAL RADIOLOGY | Facility: HOSPITAL | Age: 55
End: 2020-08-29

## 2020-08-29 VITALS
WEIGHT: 230.82 LBS | TEMPERATURE: 98 F | BODY MASS INDEX: 37.1 KG/M2 | SYSTOLIC BLOOD PRESSURE: 154 MMHG | HEIGHT: 66 IN | HEART RATE: 81 BPM | RESPIRATION RATE: 16 BRPM | OXYGEN SATURATION: 100 % | DIASTOLIC BLOOD PRESSURE: 87 MMHG

## 2020-08-29 DIAGNOSIS — S39.012A LUMBAR STRAIN, INITIAL ENCOUNTER: Primary | ICD-10-CM

## 2020-08-29 DIAGNOSIS — M62.830 MUSCLE SPASM OF BACK: ICD-10-CM

## 2020-08-29 PROCEDURE — 99283 EMERGENCY DEPT VISIT LOW MDM: CPT

## 2020-08-29 PROCEDURE — 72110 X-RAY EXAM L-2 SPINE 4/>VWS: CPT

## 2020-08-29 RX ORDER — OXYCODONE HYDROCHLORIDE 5 MG/1
10 TABLET ORAL ONCE
Status: COMPLETED | OUTPATIENT
Start: 2020-08-29 | End: 2020-08-29

## 2020-08-29 RX ORDER — METHOCARBAMOL 500 MG/1
500 TABLET, FILM COATED ORAL 4 TIMES DAILY
Qty: 20 TABLET | Refills: 0 | Status: SHIPPED | OUTPATIENT
Start: 2020-08-29 | End: 2020-09-03

## 2020-08-29 RX ORDER — HYDROCODONE BITARTRATE AND ACETAMINOPHEN 10; 325 MG/1; MG/1
1 TABLET ORAL EVERY 6 HOURS PRN
Qty: 20 TABLET | Refills: 0 | Status: SHIPPED | OUTPATIENT
Start: 2020-08-29 | End: 2020-09-01

## 2020-08-29 RX ADMIN — OXYCODONE HYDROCHLORIDE 10 MG: 5 TABLET ORAL at 13:36

## 2020-08-31 ENCOUNTER — TELEPHONE (OUTPATIENT)
Dept: FAMILY MEDICINE CLINIC | Facility: CLINIC | Age: 55
End: 2020-08-31

## 2020-08-31 NOTE — TELEPHONE ENCOUNTER
Gave message to patient at 3:21pm and scheduled an appt with CMM for tomorrow at 2:15pm.  Patient said she is now fine with the muscle relaxer.  It is the Hydrocodone that is making her sick.

## 2020-08-31 NOTE — TELEPHONE ENCOUNTER
Can increase the diclofenac to 3/day and we can call in another muscle relaxer for her- would use biofreeze on back as well

## 2020-08-31 NOTE — TELEPHONE ENCOUNTER
PATIENT CALLED AND STATES SHE WENT TO [facility] ON 8/29/2020 FOR PAIN IN HER LOWER BACK. THEY TOOK X RAYS AND EVERYTHING LOOKED FINE.   THEY GAVE HER   HYDROcodone-acetaminophen (NORCO)  MG per tablet WHICH MAKES HER SICK WHEN SHE TAKES IT.   METHOCARBAMOL  500 MG  IT DOESN'T SEEM TO BE HELPING.    PLEASE CALL WITH APPOINTMENT [phone]    PLEASE GIVE ANY SUGGESTIONS TO HELP WITH PAIN.

## 2020-09-01 ENCOUNTER — OFFICE VISIT (OUTPATIENT)
Dept: FAMILY MEDICINE CLINIC | Facility: CLINIC | Age: 55
End: 2020-09-01

## 2020-09-01 VITALS
BODY MASS INDEX: 34.55 KG/M2 | RESPIRATION RATE: 16 BRPM | HEIGHT: 66 IN | DIASTOLIC BLOOD PRESSURE: 90 MMHG | WEIGHT: 215 LBS | OXYGEN SATURATION: 97 % | SYSTOLIC BLOOD PRESSURE: 150 MMHG | TEMPERATURE: 98.2 F | HEART RATE: 105 BPM

## 2020-09-01 DIAGNOSIS — M54.50 ACUTE MIDLINE LOW BACK PAIN WITHOUT SCIATICA: Primary | ICD-10-CM

## 2020-09-01 PROCEDURE — 99213 OFFICE O/P EST LOW 20 MIN: CPT | Performed by: INTERNAL MEDICINE

## 2020-09-01 NOTE — PROGRESS NOTES
"Rooming Tab(CC,VS,Pt Hx,Fall Screen)  Chief Complaint   Patient presents with   • Back Pain       Subjective   Pt here for ER follow up- over weekend with tight back and locked up- hd ot be carried to ER/car- the pain meds were severe nausea so only got 2 and quit   using the muscle relaxer more-  And helping    never filled the diclofenac- as was filling well until this acutely happened- feels like doing better with workouts over all and wants to get back into    took 3 aleve Friday night. and has biofreeze  I have reviewed and updated her medications, medical history and problem list during today's office visit.     Patient Care Team:  Sofia Whipple MD as PCP - General    Problem List Tab  Medications Tab  Synopsis Tab  Chart Review Tab  Care Everywhere Tab  Immunizations Tab  Patient History Tab    Social History     Tobacco Use   • Smoking status: Former Smoker   • Smokeless tobacco: Never Used   • Tobacco comment: 24 yrs ago   Substance Use Topics   • Alcohol use: Yes     Comment: 3 times per year       Review of Systems   Constitutional: Negative for fatigue.   Cardiovascular: Negative for chest pain.   Gastrointestinal: Negative for abdominal distention.   Musculoskeletal: Positive for arthralgias, back pain and joint swelling.   Psychiatric/Behavioral: Negative for sleep disturbance.       Objective     Rooming Tab(CC,VS,Pt Hx,Fall Screen)  /90 (BP Location: Right arm, Cuff Size: Adult)   Pulse 105   Temp 98.2 °F (36.8 °C)   Resp 16   Ht 166.4 cm (65.5\")   Wt 97.5 kg (215 lb)   SpO2 97%   BMI 35.23 kg/m²     Body mass index is 35.23 kg/m².    Physical Exam   Constitutional: She is oriented to person, place, and time. She appears well-developed and well-nourished.   HENT:   Head: Normocephalic and atraumatic.   Right Ear: Tympanic membrane normal.   Left Ear: Tympanic membrane normal.   Eyes: Pupils are equal, round, and reactive to light.   Neck: Normal range of motion. Neck supple. "   Cardiovascular: Normal rate and regular rhythm.   No murmur heard.  Pulmonary/Chest: Effort normal and breath sounds normal.   Abdominal: Soft. Bowel sounds are normal. She exhibits no distension.   Neurological: She is oriented to person, place, and time.   Tender diffuse lumbar and into SI- no midline tender   Skin: Capillary refill takes less than 2 seconds.   Psychiatric: She has a normal mood and affect.   Nursing note and vitals reviewed.       Statin Choice Calculator  Data Reviewed:    Xr Spine Lumbar Complete 4+vw    Result Date: 8/29/2020  Impression: 1. No acute osseous abnormality. 2. Mild degenerative findings above.  Electronically Signed By-Prince Hampton On:8/29/2020 2:09 PM This report was finalized on [id number] by  Prince Hampton, .            Lab Results   Component Value Date    BUN 23 (H) 07/21/2020    CREATININE 1.23 (H) 07/21/2020    EGFRIFNONA 45 (L) 07/21/2020     07/21/2020    K 4.1 07/21/2020     07/21/2020    CALCIUM 9.5 07/21/2020    ALBUMIN 4.30 07/21/2020    BILITOT 0.2 07/21/2020    ALKPHOS 99 07/21/2020    AST 15 07/21/2020    ALT 18 07/21/2020    TRIG 156 (H) 07/21/2020    HDL 54 07/21/2020    VLDL 31.2 07/21/2020    LDL 94 07/21/2020    LDLHDL 1.74 07/21/2020    WBC 6.74 07/21/2020    RBC 4.33 07/21/2020    HCT 39.6 07/21/2020    MCV 91.5 07/21/2020    MCH 29.8 07/21/2020    CVEF45OX 55.7 07/21/2020      Assessment/Plan   Order Review Tab  Health Maintenance Tab  Patient Plan/Order Tab  Diagnoses and all orders for this visit:    1. Acute midline low back pain without sciatica (Primary)        Wrapup Tab  Return if symptoms worsen or fail to improve.       They were informed of the diagnosis and treatment plan and directed to f/u for any further problems or concerns.

## 2020-09-16 ENCOUNTER — TELEPHONE (OUTPATIENT)
Dept: FAMILY MEDICINE CLINIC | Facility: CLINIC | Age: 55
End: 2020-09-16

## 2020-09-16 RX ORDER — METHOCARBAMOL 500 MG/1
500 TABLET, FILM COATED ORAL 4 TIMES DAILY
Qty: 60 TABLET | Refills: 0 | Status: SHIPPED | OUTPATIENT
Start: 2020-09-16 | End: 2023-03-12

## 2020-09-16 NOTE — TELEPHONE ENCOUNTER
METHOCARBAMOL 500 MG TABLET ONE BY MOUTH 4 TIMES A DAY  PATIENT WOULD LIKE TO REQUEST THIS MEDICATION FOR HER BACK.     PLEASE ADVISE  [phone]     [facility] - [place], [place] -[id number] - [place], [place] - [address] [facility] - [phone]  - [phone] FX

## 2020-09-25 DIAGNOSIS — R92.8 ABNORMAL MAMMOGRAM: ICD-10-CM

## 2021-07-16 DIAGNOSIS — G43.009 MIGRAINE WITHOUT AURA AND WITHOUT STATUS MIGRAINOSUS, NOT INTRACTABLE: ICD-10-CM

## 2021-07-16 RX ORDER — RIZATRIPTAN BENZOATE 10 MG/1
TABLET, ORALLY DISINTEGRATING ORAL
Qty: 12 TABLET | Refills: 12 | Status: SHIPPED | OUTPATIENT
Start: 2021-07-16 | End: 2022-08-08

## 2022-08-08 DIAGNOSIS — G43.009 MIGRAINE WITHOUT AURA AND WITHOUT STATUS MIGRAINOSUS, NOT INTRACTABLE: ICD-10-CM

## 2022-08-08 RX ORDER — RIZATRIPTAN BENZOATE 10 MG/1
TABLET, ORALLY DISINTEGRATING ORAL
Qty: 12 TABLET | Refills: 3 | Status: SHIPPED | OUTPATIENT
Start: 2022-08-08 | End: 2023-02-16 | Stop reason: SDUPTHER

## 2023-02-13 DIAGNOSIS — G43.009 MIGRAINE WITHOUT AURA AND WITHOUT STATUS MIGRAINOSUS, NOT INTRACTABLE: ICD-10-CM

## 2023-02-13 RX ORDER — RIZATRIPTAN BENZOATE 10 MG/1
TABLET, ORALLY DISINTEGRATING ORAL
Qty: 12 TABLET | Refills: 3 | Status: CANCELLED | OUTPATIENT
Start: 2023-02-13

## 2023-02-13 NOTE — TELEPHONE ENCOUNTER
Caller: Betty Dunham    Relationship: Self    Best call back number:   [phone] (Mobile)    Requested Prescriptions:   Requested Prescriptions     Pending Prescriptions Disp Refills   • rizatriptan MLT (MAXALT-MLT) 10 MG disintegrating tablet 12 tablet 3     Sig: May repeat in 2 hours if needed        Pharmacy where request should be sent:   [facility] - [place], [place] -[phone] [facility] - [place], [place] - [address] [id number] [place] [phone] [phone]    Additional details provided by patient: DOES PATIENT NEED APPT     Does the patient have less than a 3 day supply:  [x] Yes  [] No    Would you like a call back once the refill request has been completed: [x] Yes [] No    If the office needs to give you a call back, can they leave a voicemail: [] Yes [x] No    Tyrone Akers   02/13/23 16:15 EST

## 2023-02-16 DIAGNOSIS — G43.009 MIGRAINE WITHOUT AURA AND WITHOUT STATUS MIGRAINOSUS, NOT INTRACTABLE: ICD-10-CM

## 2023-02-16 RX ORDER — RIZATRIPTAN BENZOATE 10 MG/1
10 TABLET, ORALLY DISINTEGRATING ORAL ONCE AS NEEDED
Qty: 12 TABLET | Refills: 3 | Status: SHIPPED | OUTPATIENT
Start: 2023-02-16 | End: 2023-03-10 | Stop reason: SDUPTHER

## 2023-03-10 ENCOUNTER — LAB (OUTPATIENT)
Dept: FAMILY MEDICINE CLINIC | Facility: CLINIC | Age: 58
End: 2023-03-10
Payer: COMMERCIAL

## 2023-03-10 ENCOUNTER — OFFICE VISIT (OUTPATIENT)
Dept: FAMILY MEDICINE CLINIC | Facility: CLINIC | Age: 58
End: 2023-03-10
Payer: COMMERCIAL

## 2023-03-10 VITALS
HEART RATE: 76 BPM | DIASTOLIC BLOOD PRESSURE: 75 MMHG | OXYGEN SATURATION: 98 % | WEIGHT: 198 LBS | BODY MASS INDEX: 31.82 KG/M2 | HEIGHT: 66 IN | SYSTOLIC BLOOD PRESSURE: 121 MMHG

## 2023-03-10 DIAGNOSIS — Z00.00 ENCOUNTER FOR GENERAL ADULT MEDICAL EXAMINATION WITHOUT ABNORMAL FINDINGS: Primary | ICD-10-CM

## 2023-03-10 DIAGNOSIS — E55.9 VITAMIN D DEFICIENCY: ICD-10-CM

## 2023-03-10 DIAGNOSIS — Z78.0 MENOPAUSE: ICD-10-CM

## 2023-03-10 DIAGNOSIS — G43.009 MIGRAINE WITHOUT AURA AND WITHOUT STATUS MIGRAINOSUS, NOT INTRACTABLE: ICD-10-CM

## 2023-03-10 DIAGNOSIS — Z12.31 ENCOUNTER FOR SCREENING MAMMOGRAM FOR MALIGNANT NEOPLASM OF BREAST: ICD-10-CM

## 2023-03-10 DIAGNOSIS — Z00.00 ENCOUNTER FOR GENERAL ADULT MEDICAL EXAMINATION WITHOUT ABNORMAL FINDINGS: ICD-10-CM

## 2023-03-10 LAB
25(OH)D3 SERPL-MCNC: 29.8 NG/ML (ref 30–100)
ALBUMIN SERPL-MCNC: 4.2 G/DL (ref 3.5–5.2)
ALBUMIN/GLOB SERPL: 1.5 G/DL
ALP SERPL-CCNC: 114 U/L (ref 39–117)
ALT SERPL W P-5'-P-CCNC: 21 U/L (ref 1–33)
ANION GAP SERPL CALCULATED.3IONS-SCNC: 8 MMOL/L (ref 5–15)
AST SERPL-CCNC: 15 U/L (ref 1–32)
BASOPHILS # BLD AUTO: 0.04 10*3/MM3 (ref 0–0.2)
BASOPHILS NFR BLD AUTO: 0.6 % (ref 0–1.5)
BILIRUB SERPL-MCNC: 0.4 MG/DL (ref 0–1.2)
BILIRUB UR QL STRIP: NEGATIVE
BUN SERPL-MCNC: 19 MG/DL (ref 6–20)
BUN/CREAT SERPL: 16.8 (ref 7–25)
CALCIUM SPEC-SCNC: 9 MG/DL (ref 8.6–10.5)
CHLORIDE SERPL-SCNC: 103 MMOL/L (ref 98–107)
CHOLEST SERPL-MCNC: 166 MG/DL (ref 0–200)
CLARITY UR: CLEAR
CO2 SERPL-SCNC: 27 MMOL/L (ref 22–29)
COLOR UR: YELLOW
CREAT SERPL-MCNC: 1.13 MG/DL (ref 0.57–1)
DEPRECATED RDW RBC AUTO: 41.1 FL (ref 37–54)
EGFRCR SERPLBLD CKD-EPI 2021: 56.2 ML/MIN/1.73
EOSINOPHIL # BLD AUTO: 0.17 10*3/MM3 (ref 0–0.4)
EOSINOPHIL NFR BLD AUTO: 2.7 % (ref 0.3–6.2)
ERYTHROCYTE [DISTWIDTH] IN BLOOD BY AUTOMATED COUNT: 12.7 % (ref 12.3–15.4)
GLOBULIN UR ELPH-MCNC: 2.8 GM/DL
GLUCOSE SERPL-MCNC: 101 MG/DL (ref 65–99)
GLUCOSE UR STRIP-MCNC: NEGATIVE MG/DL
HCT VFR BLD AUTO: 37.5 % (ref 34–46.6)
HDLC SERPL-MCNC: 52 MG/DL (ref 40–60)
HGB BLD-MCNC: 12.7 G/DL (ref 12–15.9)
HGB UR QL STRIP.AUTO: NEGATIVE
IMM GRANULOCYTES # BLD AUTO: 0.02 10*3/MM3 (ref 0–0.05)
IMM GRANULOCYTES NFR BLD AUTO: 0.3 % (ref 0–0.5)
KETONES UR QL STRIP: NEGATIVE
LDLC SERPL CALC-MCNC: 87 MG/DL (ref 0–100)
LDLC/HDLC SERPL: 1.58 {RATIO}
LEUKOCYTE ESTERASE UR QL STRIP.AUTO: NEGATIVE
LYMPHOCYTES # BLD AUTO: 1.85 10*3/MM3 (ref 0.7–3.1)
LYMPHOCYTES NFR BLD AUTO: 29.5 % (ref 19.6–45.3)
MCH RBC QN AUTO: 30.1 PG (ref 26.6–33)
MCHC RBC AUTO-ENTMCNC: 33.9 G/DL (ref 31.5–35.7)
MCV RBC AUTO: 88.9 FL (ref 79–97)
MONOCYTES # BLD AUTO: 0.41 10*3/MM3 (ref 0.1–0.9)
MONOCYTES NFR BLD AUTO: 6.5 % (ref 5–12)
NEUTROPHILS NFR BLD AUTO: 3.79 10*3/MM3 (ref 1.7–7)
NEUTROPHILS NFR BLD AUTO: 60.4 % (ref 42.7–76)
NITRITE UR QL STRIP: NEGATIVE
NRBC BLD AUTO-RTO: 0 /100 WBC (ref 0–0.2)
PH UR STRIP.AUTO: 6.5 [PH] (ref 5–8)
PLATELET # BLD AUTO: 241 10*3/MM3 (ref 140–450)
PMV BLD AUTO: 9.4 FL (ref 6–12)
POTASSIUM SERPL-SCNC: 4.3 MMOL/L (ref 3.5–5.2)
PROT SERPL-MCNC: 7 G/DL (ref 6–8.5)
PROT UR QL STRIP: NEGATIVE
RBC # BLD AUTO: 4.22 10*6/MM3 (ref 3.77–5.28)
SODIUM SERPL-SCNC: 138 MMOL/L (ref 136–145)
SP GR UR STRIP: 1.01 (ref 1–1.03)
TRIGL SERPL-MCNC: 158 MG/DL (ref 0–150)
TSH SERPL DL<=0.05 MIU/L-ACNC: 1.15 UIU/ML (ref 0.27–4.2)
UROBILINOGEN UR QL STRIP: NORMAL
VLDLC SERPL-MCNC: 27 MG/DL (ref 5–40)
WBC NRBC COR # BLD: 6.28 10*3/MM3 (ref 3.4–10.8)

## 2023-03-10 PROCEDURE — 81003 URINALYSIS AUTO W/O SCOPE: CPT | Performed by: INTERNAL MEDICINE

## 2023-03-10 PROCEDURE — 84443 ASSAY THYROID STIM HORMONE: CPT | Performed by: INTERNAL MEDICINE

## 2023-03-10 PROCEDURE — 90715 TDAP VACCINE 7 YRS/> IM: CPT | Performed by: INTERNAL MEDICINE

## 2023-03-10 PROCEDURE — 36415 COLL VENOUS BLD VENIPUNCTURE: CPT | Performed by: INTERNAL MEDICINE

## 2023-03-10 PROCEDURE — 80053 COMPREHEN METABOLIC PANEL: CPT | Performed by: INTERNAL MEDICINE

## 2023-03-10 PROCEDURE — 82306 VITAMIN D 25 HYDROXY: CPT | Performed by: INTERNAL MEDICINE

## 2023-03-10 PROCEDURE — 80061 LIPID PANEL: CPT | Performed by: INTERNAL MEDICINE

## 2023-03-10 PROCEDURE — 99396 PREV VISIT EST AGE 40-64: CPT | Performed by: INTERNAL MEDICINE

## 2023-03-10 PROCEDURE — 90471 IMMUNIZATION ADMIN: CPT | Performed by: INTERNAL MEDICINE

## 2023-03-10 PROCEDURE — 85025 COMPLETE CBC W/AUTO DIFF WBC: CPT | Performed by: INTERNAL MEDICINE

## 2023-03-10 RX ORDER — RIZATRIPTAN BENZOATE 10 MG/1
10 TABLET, ORALLY DISINTEGRATING ORAL ONCE AS NEEDED
Qty: 12 TABLET | Refills: 12 | Status: SHIPPED | OUTPATIENT
Start: 2023-03-10

## 2023-03-10 NOTE — PROGRESS NOTES
"Rooming Tab(CC,VS,Pt Hx,Fall Screen)  Chief Complaint   Patient presents with   • Headache   • Annual Exam       Subjective     I have reviewed and updated her medications, medical history and problem list during today's office visit.     The patient presents today for a follow-up.    Insomnia  The patient has not slept well in a long time. She does not want to start Ambien. She attributes some of her insomnia to poor sleep hygiene as she stays up late and wakes up early. She is able to sleep well if she takes 2 tablets of Benadryl. She has to take them by no later than 8:00 PM. She does not feel drowsy the next morning. She experiences occasional attacks of itchiness. If her cat scratches her, she will scratch herself until she gives herself hives on her bilateral ankles, lower extremities, and hands. She carries Benadryl with her.     Palpitations  The patient feels an intermittent flutter behind her right breast that reminds her of something that is \"shocking and it is trying to go but it cannot.\" She denies any kind of pain. She experienced a couple of episodes of these palpitations on 03/09/2023. She was mopping earlier in the week and performed yard work on 03/04/2023 or 03/05/2023.    Migraines  The patient has migraines. She pops her jaw on the right side when these occur. She finds this improves her symptoms.    Hip pain  The patient returned to her knee doctor for her bilateral hips, and he took an x-ray and referred her to physical therapy. She did not find physical therapy improved her symptoms. She is not ready for hip replacements, however, her bilateral hips are significantly bothersome. Her pain after riding a horse is severe. She tries to take 2 Advil before she rides, which does not relieve her pain.    Health maintenance  The patient has not had a mammogram in 2 years. She had a DEXA scan in 2018 or 2019. She would like her tetanus vaccination today. She has constant lower back pain, especially " "when she gets up or if she sits too long and gets stiff. She has a history of frequent urinary tract infections.    Patient Care Team:  Sofia Whipple MD as PCP - General    Problem List Tab  Medications Tab  Synopsis Tab  Chart Review Tab  Care Everywhere Tab  Immunizations Tab  Patient History Tab    Social History     Tobacco Use   • Smoking status: Former     Types: Cigarettes   • Smokeless tobacco: Never   • Tobacco comments:     24 yrs ago   Substance Use Topics   • Alcohol use: Yes     Comment: 3 times per year       Review of Systems  Answers for HPI/ROS submitted by the patient on 3/10/2023  What is the primary reason for your visit?: Physical      Objective     Rooming Tab(CC,VS,Pt Hx,Fall Screen)  /75   Pulse 76   Ht 166.4 cm (65.5\")   Wt 89.8 kg (198 lb)   SpO2 98%   BMI 32.45 kg/m²     Body mass index is 32.45 kg/m².    Physical Exam  Vitals and nursing note reviewed.   Constitutional:       Appearance: Normal appearance. She is well-developed.   HENT:      Head: Normocephalic and atraumatic.      Right Ear: Tympanic membrane normal.      Left Ear: Tympanic membrane normal.      Nose: No rhinorrhea.      Mouth/Throat:      Pharynx: No posterior oropharyngeal erythema.   Eyes:      Pupils: Pupils are equal, round, and reactive to light.   Cardiovascular:      Rate and Rhythm: Normal rate and regular rhythm.      Pulses: Normal pulses.      Heart sounds: Normal heart sounds. No murmur heard.  Pulmonary:      Effort: Pulmonary effort is normal.      Breath sounds: Normal breath sounds.   Chest:   Breasts:     Right: No inverted nipple or mass.      Left: No inverted nipple or mass.   Abdominal:      General: Bowel sounds are normal. There is no distension.      Palpations: Abdomen is soft.   Musculoskeletal:         General: No tenderness.      Cervical back: Normal range of motion and neck supple.   Skin:     Capillary Refill: Capillary refill takes less than 2 seconds.   Neurological: "      Mental Status: She is alert and oriented to person, place, and time.   Psychiatric:         Mood and Affect: Mood normal.         Behavior: Behavior normal.          Statin Choice Calculator  Data Reviewed:         The data below has been reviewed by Sofia Whipple MD on 03/10/2023.      Lab Results   Component Value Date    BUN 19 03/10/2023    CREATININE 1.13 (H) 03/10/2023     03/10/2023    K 4.3 03/10/2023     03/10/2023    CALCIUM 9.0 03/10/2023    ALBUMIN 4.2 03/10/2023    BILITOT 0.4 03/10/2023    ALKPHOS 114 03/10/2023    AST 15 03/10/2023    ALT 21 03/10/2023    TRIG 158 (H) 03/10/2023    HDL 52 03/10/2023    VLDL 27 03/10/2023    LDL 87 03/10/2023    LDLHDL 1.58 03/10/2023    WBC 6.28 03/10/2023    RBC 4.22 03/10/2023    HCT 37.5 03/10/2023    MCV 88.9 03/10/2023    MCH 30.1 03/10/2023    TSH 1.150 03/10/2023    HVZL52SP 29.8 (L) 03/10/2023      Assessment & Plan   Order Review Tab  Health Maintenance Tab  Patient Plan/Order Tab  Diagnoses and all orders for this visit:    1. Encounter for general adult medical examination without abnormal findings (Primary)  Comments:  discussed all recommendations  Orders:  -     Comprehensive Metabolic Panel  -     CBC Auto Differential  -     Lipid Panel  -     TSH  -     Urinalysis With Culture If Indicated -; Future  -     Tdap Vaccine Greater Than or Equal To 8yo IM    2. Vitamin D deficiency  -     Vitamin D,25-Hydroxy    3. Migraine without aura and without status migrainosus, not intractable  -     rizatriptan MLT (MAXALT-MLT) 10 MG disintegrating tablet; Place 1 tablet on the tongue 1 (One) Time As Needed for Migraine. May repeat in 2 hr if needed max 2 per day  Dispense: 12 tablet; Refill: 12    4. Encounter for screening mammogram for malignant neoplasm of breast  -     Mammo Screening Digital Tomosynthesis Bilateral With CAD; Future    5. Menopause  -     DEXA Bone Density Axial; Future        1. Encounter for general adult medical  examination without abnormal findings (Primary)  - We will obtain routine blood work.  - We will obtain a urine sample.     2. Vitamin D deficiency  - We will obtain blood work.     3. Migraine without aura and without status migrainosus, not intractable  - We will refill the patient's rizatriptan 10 mg once as needed for migraines.     4. Encounter for screening mammogram for malignant neoplasm of breast  - We will order a screening mammogram.     5. Menopause  - We will order a DEXA scan.    Wrapup Tab  Return in about 1 year (around 3/10/2024), or if symptoms worsen or fail to improve, for Annual physical.       They were informed of the diagnosis and treatment plan and directed to f/u for any further problems or concerns.        Transcribed from ambient dictation for Soifa Whipple MD by Arlette Arambula.  03/10/23   13:18 EST    Patient or patient representative verbalized consent to the visit recording.  I have personally performed the services described in this document as transcribed by the above individual, and it is both accurate and complete.  Sofia Whipple MD  3/12/2023  20:20 EDT

## 2023-03-13 NOTE — PROGRESS NOTES
Vitamin D is low- take 2000 units of d3 year round. And kidneys continue to be minimally abnormal- work on increase water consumption

## 2023-04-12 DIAGNOSIS — Z12.31 ENCOUNTER FOR SCREENING MAMMOGRAM FOR MALIGNANT NEOPLASM OF BREAST: ICD-10-CM

## 2023-04-13 DIAGNOSIS — Z78.0 MENOPAUSE: ICD-10-CM

## 2023-05-16 ENCOUNTER — PATIENT MESSAGE (OUTPATIENT)
Dept: FAMILY MEDICINE CLINIC | Facility: CLINIC | Age: 58
End: 2023-05-16
Payer: COMMERCIAL

## 2023-05-16 DIAGNOSIS — G43.009 MIGRAINE WITHOUT AURA AND WITHOUT STATUS MIGRAINOSUS, NOT INTRACTABLE: ICD-10-CM

## 2023-05-17 RX ORDER — RIZATRIPTAN BENZOATE 10 MG/1
10 TABLET, ORALLY DISINTEGRATING ORAL ONCE AS NEEDED
Qty: 12 TABLET | Refills: 12 | Status: SHIPPED | OUTPATIENT
Start: 2023-05-17

## 2024-01-18 ENCOUNTER — OFFICE VISIT (OUTPATIENT)
Dept: FAMILY MEDICINE CLINIC | Facility: CLINIC | Age: 59
End: 2024-01-18
Payer: COMMERCIAL

## 2024-01-18 VITALS
BODY MASS INDEX: 33.78 KG/M2 | OXYGEN SATURATION: 98 % | HEIGHT: 66 IN | DIASTOLIC BLOOD PRESSURE: 72 MMHG | SYSTOLIC BLOOD PRESSURE: 108 MMHG | WEIGHT: 210.2 LBS | RESPIRATION RATE: 16 BRPM | HEART RATE: 70 BPM

## 2024-01-18 DIAGNOSIS — E55.9 VITAMIN D DEFICIENCY: ICD-10-CM

## 2024-01-18 DIAGNOSIS — G43.009 MIGRAINE WITHOUT AURA AND WITHOUT STATUS MIGRAINOSUS, NOT INTRACTABLE: Primary | ICD-10-CM

## 2024-01-18 PROBLEM — Z12.39 SCREENING FOR BREAST CANCER: Status: RESOLVED | Noted: 2020-06-27 | Resolved: 2024-01-18

## 2024-01-18 PROBLEM — M54.50 ACUTE BILATERAL LOW BACK PAIN WITHOUT SCIATICA: Status: RESOLVED | Noted: 2020-09-01 | Resolved: 2024-01-18

## 2024-01-18 PROBLEM — R92.8 ABNORMAL MAMMOGRAM: Status: RESOLVED | Noted: 2019-06-03 | Resolved: 2024-01-18

## 2024-01-18 PROBLEM — M54.50 ACUTE MIDLINE LOW BACK PAIN WITHOUT SCIATICA: Status: RESOLVED | Noted: 2020-06-27 | Resolved: 2024-01-18

## 2024-01-18 PROCEDURE — 99213 OFFICE O/P EST LOW 20 MIN: CPT | Performed by: STUDENT IN AN ORGANIZED HEALTH CARE EDUCATION/TRAINING PROGRAM

## 2024-01-18 NOTE — PROGRESS NOTES
"Chief Complaint  Establish Care (Migraines )    Subjective        Betty Dunham presents to [facility]  History of Present Illness  Betty is a 58-year-old with migraines who presents today due to who presents today due to establishing care and also follow-up on her migraines.  Reviewed her past medical history, social history, surgical history, family history.    Migraines  Recently she seen an uptake in her migraines symptoms.  Stated that she was having them pretty frequently over the last couple months happening every other day or not more.  She states that she struggled with this and has been taking her rizatriptan without much relief of the symptoms.  She has tried over-the-counter medication without much success as well.  No changes in vision, neurological changes, hand changes.  She would like to try to see what is going on with this.  Denies any vomiting.         Objective   Vital Signs:  /72 (BP Location: Left arm, Patient Position: Sitting, Cuff Size: Large Adult)   Pulse 70   Resp 16   Ht 166.4 cm (65.5\")   Wt 95.3 kg (210 lb 3.2 oz)   SpO2 98%   BMI 34.45 kg/m²   Estimated body mass index is 34.45 kg/m² as calculated from the following:    Height as of this encounter: 166.4 cm (65.5\").    Weight as of this encounter: 95.3 kg (210 lb 3.2 oz).               Physical Exam  Vitals and nursing note reviewed.   Constitutional:       General: She is not in acute distress.     Appearance: Normal appearance.   HENT:      Head: Normocephalic and atraumatic.      Right Ear: Tympanic membrane and ear canal normal.      Left Ear: Tympanic membrane and ear canal normal.      Nose: Nose normal. No congestion.      Mouth/Throat:      Mouth: Mucous membranes are moist.      Pharynx: No oropharyngeal exudate or posterior oropharyngeal erythema.   Eyes:      Extraocular Movements: Extraocular movements intact.      Conjunctiva/sclera: Conjunctivae normal.      Pupils: Pupils " are equal, round, and reactive to light.   Cardiovascular:      Rate and Rhythm: Normal rate and regular rhythm.      Pulses: Normal pulses.      Heart sounds: Normal heart sounds. No murmur heard.  Pulmonary:      Effort: Pulmonary effort is normal. No respiratory distress.      Breath sounds: Normal breath sounds. No wheezing or rales.   Abdominal:      General: Abdomen is flat. Bowel sounds are normal. There is no distension.      Palpations: Abdomen is soft.      Tenderness: There is no abdominal tenderness.   Musculoskeletal:      Cervical back: Normal range of motion and neck supple. No rigidity.   Lymphadenopathy:      Cervical: No cervical adenopathy.   Skin:     General: Skin is warm.      Capillary Refill: Capillary refill takes less than 2 seconds.   Neurological:      General: No focal deficit present.      Mental Status: She is alert.   Psychiatric:         Mood and Affect: Mood normal.        Result Review :    The following data was reviewed by: Otoniel Ocasio MD on 01/18/2024:  Common labs          3/10/2023    11:14   Common Labs   Glucose 101    BUN 19    Creatinine 1.13    Sodium 138    Potassium 4.3    Chloride 103    Calcium 9.0    Albumin 4.2    Total Bilirubin 0.4    Alkaline Phosphatase 114    AST (SGOT) 15    ALT (SGPT) 21    WBC 6.28    Hemoglobin 12.7    Hematocrit 37.5    Platelets 241    Total Cholesterol 166    Triglycerides 158    HDL Cholesterol 52    LDL Cholesterol  87      Data reviewed : Previous notes             Assessment and Plan     Diagnoses and all orders for this visit:    1. Migraine without aura and without status migrainosus, not intractable (Primary)    2. Vitamin D deficiency    Other orders  -     Rimegepant Sulfate (NURTEC) 75 MG tablet dispersible tablet; Take 1 tablet by mouth Every Other Day. Can take 1 additional if acute migraine occurs  Dispense: 16 tablet; Refill: 1    Will start Nurtec every other day for migraine prevention.  She can always take 1 more if  she has an acute migraine on top of this.  She has been on rizatriptan in the past for migraines and it seemed to fail and also is progressing.  She has been on Topamax in the past as well for migraine prevention.  If her migraines do not get better, we will have to pursue further imaging of the head to see if there is anything underlying for this.  Thanks         Follow Up     Return in about 3 months (around 4/18/2024) for Annual physical.  Patient was given instructions and counseling regarding her condition or for health maintenance advice. Please see specific information pulled into the AVS if appropriate.

## 2024-01-24 ENCOUNTER — TELEPHONE (OUTPATIENT)
Dept: FAMILY MEDICINE CLINIC | Facility: CLINIC | Age: 59
End: 2024-01-24
Payer: COMMERCIAL

## 2024-01-24 NOTE — TELEPHONE ENCOUNTER
"----- Message from Otoniel Ocasio MD sent at 1/23/2024  1:22 PM EST -----  Regarding: FW: Migraine Meds  Contact: [phone]  Please let me know what I can do to help with this. I put it in the chart that she has had increasing migraines while on the triptan. I don't know if they want me to try topamax first or what. Thanks!   ----- Message -----  From: iBnta Pelayo MA  Sent: 1/23/2024  12:54 PM EST  To: Otoniel Ocasio MD  Subject: FW: Migraine Meds                                  ----- Message -----  From: Betty Dunham  Sent: 1/23/2024   8:48 AM EST  To: [facility]  Subject: Migraine Meds                                    The prescription for Nurtec that you ordered was denied , and yesterday I got a letter in the mail from [facility], and they are no longer going to cover Rizatriptan, which I have taken for years.  The letter says if my doctor feels I have a \"medical need\" then they can ask for an exception.  Can you please do that for me, I have to have this medication for my migraines.  Can you please take care of this for me.  Thank You  Betty Dunham      "

## 2024-04-09 ENCOUNTER — OFFICE VISIT (OUTPATIENT)
Dept: FAMILY MEDICINE CLINIC | Facility: CLINIC | Age: 59
End: 2024-04-09
Payer: COMMERCIAL

## 2024-04-09 ENCOUNTER — LAB (OUTPATIENT)
Dept: FAMILY MEDICINE CLINIC | Facility: CLINIC | Age: 59
End: 2024-04-09
Payer: COMMERCIAL

## 2024-04-09 VITALS
OXYGEN SATURATION: 98 % | SYSTOLIC BLOOD PRESSURE: 110 MMHG | WEIGHT: 206.5 LBS | DIASTOLIC BLOOD PRESSURE: 78 MMHG | HEART RATE: 66 BPM | RESPIRATION RATE: 16 BRPM | HEIGHT: 66 IN | BODY MASS INDEX: 33.19 KG/M2

## 2024-04-09 DIAGNOSIS — G43.009 MIGRAINE WITHOUT AURA AND WITHOUT STATUS MIGRAINOSUS, NOT INTRACTABLE: Primary | ICD-10-CM

## 2024-04-09 DIAGNOSIS — Z00.00 ENCOUNTER FOR ANNUAL PHYSICAL EXAM: ICD-10-CM

## 2024-04-09 DIAGNOSIS — Z12.31 SCREENING MAMMOGRAM FOR BREAST CANCER: ICD-10-CM

## 2024-04-09 DIAGNOSIS — R73.03 PREDIABETES: ICD-10-CM

## 2024-04-09 DIAGNOSIS — E55.9 VITAMIN D DEFICIENCY: ICD-10-CM

## 2024-04-09 DIAGNOSIS — R74.8 ELEVATED ALKALINE PHOSPHATASE LEVEL: ICD-10-CM

## 2024-04-09 DIAGNOSIS — N18.31 STAGE 3A CHRONIC KIDNEY DISEASE: ICD-10-CM

## 2024-04-09 LAB — TSH SERPL DL<=0.05 MIU/L-ACNC: 1.75 UIU/ML (ref 0.27–4.2)

## 2024-04-09 PROCEDURE — 80061 LIPID PANEL: CPT | Performed by: STUDENT IN AN ORGANIZED HEALTH CARE EDUCATION/TRAINING PROGRAM

## 2024-04-09 PROCEDURE — 84443 ASSAY THYROID STIM HORMONE: CPT | Performed by: STUDENT IN AN ORGANIZED HEALTH CARE EDUCATION/TRAINING PROGRAM

## 2024-04-09 PROCEDURE — 80053 COMPREHEN METABOLIC PANEL: CPT | Performed by: STUDENT IN AN ORGANIZED HEALTH CARE EDUCATION/TRAINING PROGRAM

## 2024-04-09 PROCEDURE — 83036 HEMOGLOBIN GLYCOSYLATED A1C: CPT | Performed by: STUDENT IN AN ORGANIZED HEALTH CARE EDUCATION/TRAINING PROGRAM

## 2024-04-09 PROCEDURE — 85025 COMPLETE CBC W/AUTO DIFF WBC: CPT | Performed by: STUDENT IN AN ORGANIZED HEALTH CARE EDUCATION/TRAINING PROGRAM

## 2024-04-09 PROCEDURE — 36415 COLL VENOUS BLD VENIPUNCTURE: CPT | Performed by: STUDENT IN AN ORGANIZED HEALTH CARE EDUCATION/TRAINING PROGRAM

## 2024-04-09 PROCEDURE — 82977 ASSAY OF GGT: CPT | Performed by: STUDENT IN AN ORGANIZED HEALTH CARE EDUCATION/TRAINING PROGRAM

## 2024-04-09 PROCEDURE — 82306 VITAMIN D 25 HYDROXY: CPT | Performed by: STUDENT IN AN ORGANIZED HEALTH CARE EDUCATION/TRAINING PROGRAM

## 2024-04-09 PROCEDURE — 99214 OFFICE O/P EST MOD 30 MIN: CPT | Performed by: STUDENT IN AN ORGANIZED HEALTH CARE EDUCATION/TRAINING PROGRAM

## 2024-04-09 RX ORDER — MELOXICAM 7.5 MG/1
TABLET ORAL
COMMUNITY
Start: 2024-03-28

## 2024-04-09 RX ORDER — RIZATRIPTAN BENZOATE 10 MG/1
10 TABLET, ORALLY DISINTEGRATING ORAL ONCE AS NEEDED
Qty: 12 TABLET | Refills: 11 | Status: SHIPPED | OUTPATIENT
Start: 2024-04-09

## 2024-04-09 RX ORDER — TRIAMCINOLONE ACETONIDE 1 MG/G
1 OINTMENT TOPICAL 2 TIMES DAILY
Qty: 30 G | Refills: 1 | Status: SHIPPED | OUTPATIENT
Start: 2024-04-09

## 2024-04-09 NOTE — PROGRESS NOTES
"Chief Complaint  Migraine    Subjective        Betty Dunham presents to [facility]  History of Present Illness  Betty is a 58-year-old with history of migraines, kidney disease who presents today for physical.  States that she is overall doing well.  Has no acute concerns.  Does worry about her weight.  States has been hard for her to lose weight.  She states that she has been trying different things and had much relief.    Migraines  States that she still gets every 2 to 3 months she will get different migraine symptoms.  She will have a run of spurt of 5-6 migraines a week for about 2 weeks.  States that the rizatriptan really makes the migraine go away.  States that she does well with this.  Does not have any issue with taking the medication.  No side effects.  She was previously prescribed Nurtec but did not care for much work due to the Advair that day dosing.        Objective   Vital Signs:  /78   Pulse 66   Resp 16   Ht 166.4 cm (65.5\")   Wt 93.7 kg (206 lb 8 oz)   SpO2 98%   BMI 33.84 kg/m²   Estimated body mass index is 33.84 kg/m² as calculated from the following:    Height as of this encounter: 166.4 cm (65.5\").    Weight as of this encounter: 93.7 kg (206 lb 8 oz).       BMI is >= 30 and <35. (Class 1 Obesity). The following options were offered after discussion;: weight loss educational material (shared in after visit summary) and exercise counseling/recommendations      Physical Exam  Vitals and nursing note reviewed.   Constitutional:       General: She is not in acute distress.     Appearance: Normal appearance.   HENT:      Head: Normocephalic and atraumatic.      Right Ear: Tympanic membrane and ear canal normal.      Left Ear: Tympanic membrane and ear canal normal.      Nose: Nose normal. No congestion.      Mouth/Throat:      Mouth: Mucous membranes are moist.      Pharynx: No oropharyngeal exudate or posterior oropharyngeal erythema.   Eyes:     "  Extraocular Movements: Extraocular movements intact.      Conjunctiva/sclera: Conjunctivae normal.      Pupils: Pupils are equal, round, and reactive to light.   Cardiovascular:      Rate and Rhythm: Normal rate and regular rhythm.      Pulses: Normal pulses.      Heart sounds: Normal heart sounds. No murmur heard.  Pulmonary:      Effort: Pulmonary effort is normal. No respiratory distress.      Breath sounds: Normal breath sounds. No wheezing or rales.   Abdominal:      General: Abdomen is flat. Bowel sounds are normal. There is no distension.      Palpations: Abdomen is soft.      Tenderness: There is no abdominal tenderness.   Musculoskeletal:      Cervical back: Normal range of motion and neck supple. No rigidity.   Lymphadenopathy:      Cervical: No cervical adenopathy.   Skin:     General: Skin is warm.      Capillary Refill: Capillary refill takes less than 2 seconds.   Neurological:      General: No focal deficit present.      Mental Status: She is alert.   Psychiatric:         Mood and Affect: Mood normal.        Result Review :    The following data was reviewed by: Otoniel Ocasio MD on 04/09/2024:  Common labs          4/9/2024    16:30   Common Labs   Glucose 88    BUN 19    Creatinine 1.37    Sodium 138    Potassium 4.7    Chloride 103    Calcium 9.2    Albumin 4.5    Total Bilirubin 0.3    Alkaline Phosphatase 123    AST (SGOT) 14    ALT (SGPT) 17    WBC 8.50    Hemoglobin 13.9    Hematocrit 41.9    Platelets 257    Total Cholesterol 155    Triglycerides 247    HDL Cholesterol 49    LDL Cholesterol  66    Hemoglobin A1C 6.00      Data reviewed : Previous notes             Assessment and Plan     Diagnoses and all orders for this visit:    1. Migraine without aura and without status migrainosus, not intractable (Primary)  -     rizatriptan MLT (MAXALT-MLT) 10 MG disintegrating tablet; Place 1 tablet on the tongue 1 (One) Time As Needed for Migraine. May repeat in 2 hr if needed max 2 per day   Dispense: 12 tablet; Refill: 11    2. Screening mammogram for breast cancer  -     Mammo Screening Digital Tomosynthesis Bilateral With CAD; Future    3. Encounter for annual physical exam  -     CBC w AUTO Differential  -     Comprehensive metabolic panel  -     Hemoglobin A1c  -     TSH Rfx On Abnormal To Free T4  -     Lipid panel; Future    4. Vitamin D deficiency  -     Vitamin D 25 hydroxy    5. Elevated alkaline phosphatase level  -     Gamma GT    6. Prediabetes    7. Stage 3a chronic kidney disease    Other orders  -     triamcinolone (KENALOG) 0.1 % ointment; Apply 1 Application topically to the appropriate area as directed 2 (Two) Times a Day.  Dispense: 30 g; Refill: 1    For migraines, continue rizatriptan as needed.  Discussed other chronic preventative medication such as Nurtec, Qulipta.  Does not want to do at this time.  Discussed possible going to neurology for Botox versus other treatment.  She would like to hold off for now.    Will get a mammogram today.  Will also get baseline labs on her today.    She is prediabetic with an A1c of 6.0.  Will also send in tirzepatide for weight management.  Discussed cost and that she may to need to pay money for sometimes is not covered.    For her kidney disease discussed Jardiance or Farxiga.  She does not want to do at this time.  Will continue to monitor and see how she is doing with her kidney disease here in the future.         Follow Up     Return in about 1 year (around 4/9/2025) for Annual physical.  Patient was given instructions and counseling regarding her condition or for health maintenance advice. Please see specific information pulled into the AVS if appropriate.

## 2024-04-10 DIAGNOSIS — R74.8 ELEVATED ALKALINE PHOSPHATASE LEVEL: Primary | ICD-10-CM

## 2024-04-10 DIAGNOSIS — E66.9 OBESITY (BMI 30.0-34.9): Primary | ICD-10-CM

## 2024-04-10 PROBLEM — N18.31 STAGE 3A CHRONIC KIDNEY DISEASE: Status: ACTIVE | Noted: 2024-04-10

## 2024-04-10 PROBLEM — R73.03 PREDIABETES: Status: ACTIVE | Noted: 2024-04-10

## 2024-04-10 PROBLEM — E66.811 OBESITY (BMI 30.0-34.9): Status: ACTIVE | Noted: 2024-04-10

## 2024-04-10 LAB
25(OH)D3 SERPL-MCNC: 40.8 NG/ML (ref 30–100)
ALBUMIN SERPL-MCNC: 4.5 G/DL (ref 3.5–5.2)
ALBUMIN/GLOB SERPL: 1.7 G/DL
ALP SERPL-CCNC: 123 U/L (ref 39–117)
ALT SERPL W P-5'-P-CCNC: 17 U/L (ref 1–33)
ANION GAP SERPL CALCULATED.3IONS-SCNC: 10 MMOL/L (ref 5–15)
AST SERPL-CCNC: 14 U/L (ref 1–32)
BASOPHILS # BLD AUTO: 0.04 10*3/MM3 (ref 0–0.2)
BASOPHILS NFR BLD AUTO: 0.5 % (ref 0–1.5)
BILIRUB SERPL-MCNC: 0.3 MG/DL (ref 0–1.2)
BUN SERPL-MCNC: 19 MG/DL (ref 8–23)
BUN/CREAT SERPL: 13.9 (ref 7–25)
CALCIUM SPEC-SCNC: 9.2 MG/DL (ref 8.6–10.5)
CHLORIDE SERPL-SCNC: 103 MMOL/L (ref 98–107)
CHOLEST SERPL-MCNC: 155 MG/DL (ref 0–200)
CO2 SERPL-SCNC: 25 MMOL/L (ref 22–29)
CREAT SERPL-MCNC: 1.37 MG/DL (ref 0.57–1)
DEPRECATED RDW RBC AUTO: 42 FL (ref 37–54)
EGFRCR SERPLBLD CKD-EPI 2021: 44.3 ML/MIN/1.73
EOSINOPHIL # BLD AUTO: 0.22 10*3/MM3 (ref 0–0.4)
EOSINOPHIL NFR BLD AUTO: 2.6 % (ref 0.3–6.2)
ERYTHROCYTE [DISTWIDTH] IN BLOOD BY AUTOMATED COUNT: 12.8 % (ref 12.3–15.4)
GGT SERPL-CCNC: 24 U/L (ref 5–36)
GLOBULIN UR ELPH-MCNC: 2.6 GM/DL
GLUCOSE SERPL-MCNC: 88 MG/DL (ref 65–99)
HBA1C MFR BLD: 6 % (ref 4.8–5.6)
HCT VFR BLD AUTO: 41.9 % (ref 34–46.6)
HDLC SERPL-MCNC: 49 MG/DL (ref 40–60)
HGB BLD-MCNC: 13.9 G/DL (ref 12–15.9)
IMM GRANULOCYTES # BLD AUTO: 0.02 10*3/MM3 (ref 0–0.05)
IMM GRANULOCYTES NFR BLD AUTO: 0.2 % (ref 0–0.5)
LDLC SERPL CALC-MCNC: 66 MG/DL (ref 0–100)
LDLC/HDLC SERPL: 1.16 {RATIO}
LYMPHOCYTES # BLD AUTO: 2.45 10*3/MM3 (ref 0.7–3.1)
LYMPHOCYTES NFR BLD AUTO: 28.8 % (ref 19.6–45.3)
MCH RBC QN AUTO: 29.8 PG (ref 26.6–33)
MCHC RBC AUTO-ENTMCNC: 33.2 G/DL (ref 31.5–35.7)
MCV RBC AUTO: 89.9 FL (ref 79–97)
MONOCYTES # BLD AUTO: 0.4 10*3/MM3 (ref 0.1–0.9)
MONOCYTES NFR BLD AUTO: 4.7 % (ref 5–12)
NEUTROPHILS NFR BLD AUTO: 5.37 10*3/MM3 (ref 1.7–7)
NEUTROPHILS NFR BLD AUTO: 63.2 % (ref 42.7–76)
NRBC BLD AUTO-RTO: 0 /100 WBC (ref 0–0.2)
PLATELET # BLD AUTO: 257 10*3/MM3 (ref 140–450)
PMV BLD AUTO: 9.8 FL (ref 6–12)
POTASSIUM SERPL-SCNC: 4.7 MMOL/L (ref 3.5–5.2)
PROT SERPL-MCNC: 7.1 G/DL (ref 6–8.5)
RBC # BLD AUTO: 4.66 10*6/MM3 (ref 3.77–5.28)
SODIUM SERPL-SCNC: 138 MMOL/L (ref 136–145)
TRIGL SERPL-MCNC: 247 MG/DL (ref 0–150)
VLDLC SERPL-MCNC: 40 MG/DL (ref 5–40)
WBC NRBC COR # BLD AUTO: 8.5 10*3/MM3 (ref 3.4–10.8)

## 2024-04-10 NOTE — PROGRESS NOTES
Called pt and notified her of lab results/message and she v/u. She states that she just seen it on mychart. She would like to wait and have labs repeated in 6 months before starting on Farxiga or Jardiance. Dr. Ocasio notified.

## 2024-04-11 ENCOUNTER — TELEPHONE (OUTPATIENT)
Dept: FAMILY MEDICINE CLINIC | Facility: CLINIC | Age: 59
End: 2024-04-11
Payer: COMMERCIAL

## 2024-04-11 NOTE — TELEPHONE ENCOUNTER
----- Message from Sofia Meyer MA sent at 4/10/2024 12:40 PM EDT -----  Regarding: FW: Prescription  Contact: [phone]    ----- Message -----  From: Betty Dunham  Sent: 4/10/2024  12:11 PM EDT  To: [facility]  Subject: Prescription                                     The pharmacy just sent me a text that my medication is $550.00  dollars, can we try [place] please.  Thanks

## 2024-05-28 DIAGNOSIS — Z12.31 SCREENING MAMMOGRAM FOR BREAST CANCER: ICD-10-CM

## 2024-11-26 ENCOUNTER — TELEPHONE (OUTPATIENT)
Dept: FAMILY MEDICINE CLINIC | Facility: CLINIC | Age: 59
End: 2024-11-26

## 2024-11-26 NOTE — TELEPHONE ENCOUNTER
Caller: BLAKE    Relationship: Other    Best call back number: [phone]    What orders are you requesting (i.e. lab or imaging): TOTAL KNEE REPLACEMENT SURGERY     In what timeframe would the patient need to come in: FEBRUARY     Where will you receive your lab/imaging services: [facility]     Additional notes: PLEASE ADVISE IF THIS CAN BE SET UP ASAP. PLEASE FOLLOW UP WITH PATIENT TO LET HER KNOW SURGERY CLEARANCE DETAILS. PATIENT WILL HAVE SURGERY DR. DAWSON. CLEARANCE FORM WILL BE FAXED.

## 2024-11-27 NOTE — TELEPHONE ENCOUNTER
Called [facility] back to let them know that I have not received clearance form yet. Was transferred to surgery scheduler and had to [facility] to let her know form not received. (Was told she was out until next Tuesday and to leave message).

## 2024-12-04 PROBLEM — Z00.00 ENCOUNTER FOR GENERAL ADULT MEDICAL EXAMINATION WITHOUT ABNORMAL FINDINGS: Status: RESOLVED | Noted: 2017-03-08 | Resolved: 2024-12-04

## 2024-12-11 ENCOUNTER — OFFICE VISIT (OUTPATIENT)
Dept: FAMILY MEDICINE CLINIC | Facility: CLINIC | Age: 59
End: 2024-12-11
Payer: COMMERCIAL

## 2024-12-11 VITALS
HEART RATE: 79 BPM | DIASTOLIC BLOOD PRESSURE: 76 MMHG | HEIGHT: 66 IN | SYSTOLIC BLOOD PRESSURE: 118 MMHG | WEIGHT: 174.4 LBS | BODY MASS INDEX: 28.03 KG/M2 | OXYGEN SATURATION: 79 %

## 2024-12-11 DIAGNOSIS — G43.009 MIGRAINE WITHOUT AURA AND WITHOUT STATUS MIGRAINOSUS, NOT INTRACTABLE: ICD-10-CM

## 2024-12-11 DIAGNOSIS — N18.31 STAGE 3A CHRONIC KIDNEY DISEASE: Primary | ICD-10-CM

## 2024-12-11 DIAGNOSIS — R73.03 PREDIABETES: ICD-10-CM

## 2024-12-11 PROCEDURE — 99214 OFFICE O/P EST MOD 30 MIN: CPT | Performed by: STUDENT IN AN ORGANIZED HEALTH CARE EDUCATION/TRAINING PROGRAM

## 2024-12-11 RX ORDER — TRAMADOL HYDROCHLORIDE 50 MG/1
50 TABLET ORAL EVERY 6 HOURS PRN
COMMUNITY
Start: 2024-11-19 | End: 2024-12-11

## 2024-12-11 NOTE — PROGRESS NOTES
"Chief Complaint  Chief Complaint   Patient presents with    Establish Care       Subjective        Betty Dunham is a 59 y.o. female who presents to [facility].  History of Present Illness    Betty is a 59 year old female here for chronic disease management.      Prediabetes  Has successfully been able to lose ~30 pounds with lifestyle modification    Migraines  Typically triggered with weather changes  Sometimes has up to 5-6/month and other times will be a few months without any  Well-controlled with Maxalt as needed        Objective   /76   Pulse 79   Ht 166.4 cm (65.5\")   Wt 79.1 kg (174 lb 6.4 oz)   SpO2 (!) 79%   BMI 28.58 kg/m²     Estimated body mass index is 28.58 kg/m² as calculated from the following:    Height as of this encounter: 166.4 cm (65.5\").    Weight as of this encounter: 79.1 kg (174 lb 6.4 oz).     Physical Exam   GEN: In no acute distress, non toxic appearing  HEENT: EOMI. Mucous membranes moist. Oropharynx without erythema or exudate.   CV: Regular rate and rhythm, no murmurs. No extremity edema.   RESP: Lungs clear to auscultation bilaterally.  No signs of respiratory distress on room air.  SKIN: No rashes  MSK: No joint erythema, deformity, or effusion.   NEURO: Alert and appropriate. CN 2-12 intact grossly.             Result Review :              Assessment and Plan     Diagnoses and all orders for this visit:    1. Stage 3a chronic kidney disease (Primary)  Unclear etiology patient without significant chronic health conditions such as hypertension, diabetes  Will repeat labs at this time and if renal function is not improved would recommend referral to nephrology for further evaluation    -     Basic Metabolic Panel  -     Microalbumin / Creatinine Urine Ratio - Urine, Clean Catch    2. Prediabetes  Most recent hemoglobin A1c of 6% in April 2024  Given the significant weight loss hopeful there is improvement in A1c    -     Hemoglobin " A1c    3. Migraine without aura and without status migrainosus, not intractable  Well-controlled with as needed Maxalt          Follow Up     Return in about 1 year (around 12/11/2025) for Annual physical.

## 2024-12-12 ENCOUNTER — LAB (OUTPATIENT)
Dept: FAMILY MEDICINE CLINIC | Facility: CLINIC | Age: 59
End: 2024-12-12
Payer: COMMERCIAL

## 2024-12-12 PROCEDURE — 80048 BASIC METABOLIC PNL TOTAL CA: CPT | Performed by: STUDENT IN AN ORGANIZED HEALTH CARE EDUCATION/TRAINING PROGRAM

## 2024-12-12 PROCEDURE — 82570 ASSAY OF URINE CREATININE: CPT | Performed by: STUDENT IN AN ORGANIZED HEALTH CARE EDUCATION/TRAINING PROGRAM

## 2024-12-12 PROCEDURE — 83036 HEMOGLOBIN GLYCOSYLATED A1C: CPT | Performed by: STUDENT IN AN ORGANIZED HEALTH CARE EDUCATION/TRAINING PROGRAM

## 2024-12-12 PROCEDURE — 36415 COLL VENOUS BLD VENIPUNCTURE: CPT | Performed by: STUDENT IN AN ORGANIZED HEALTH CARE EDUCATION/TRAINING PROGRAM

## 2024-12-12 PROCEDURE — 82043 UR ALBUMIN QUANTITATIVE: CPT | Performed by: STUDENT IN AN ORGANIZED HEALTH CARE EDUCATION/TRAINING PROGRAM

## 2024-12-13 DIAGNOSIS — N18.31 STAGE 3A CHRONIC KIDNEY DISEASE: Primary | ICD-10-CM

## 2024-12-13 LAB
ALBUMIN UR-MCNC: <1.2 MG/DL
ANION GAP SERPL CALCULATED.3IONS-SCNC: 15.5 MMOL/L (ref 5–15)
BUN SERPL-MCNC: 21 MG/DL (ref 8–23)
BUN/CREAT SERPL: 17.1 (ref 7–25)
CALCIUM SPEC-SCNC: 9.3 MG/DL (ref 8.6–10.5)
CHLORIDE SERPL-SCNC: 104 MMOL/L (ref 98–107)
CO2 SERPL-SCNC: 23.5 MMOL/L (ref 22–29)
CREAT SERPL-MCNC: 1.23 MG/DL (ref 0.57–1)
CREAT UR-MCNC: 103.2 MG/DL
EGFRCR SERPLBLD CKD-EPI 2021: 50.1 ML/MIN/1.73
GLUCOSE SERPL-MCNC: 87 MG/DL (ref 65–99)
HBA1C MFR BLD: 5.4 % (ref 4.8–5.6)
MICROALBUMIN/CREAT UR: NORMAL MG/G{CREAT}
POTASSIUM SERPL-SCNC: 4.4 MMOL/L (ref 3.5–5.2)
SODIUM SERPL-SCNC: 143 MMOL/L (ref 136–145)

## 2024-12-13 NOTE — PROGRESS NOTES
"Patient to ER with complaint of "dry eyes" both eyes free of redness or swelling   " Spoke with Betty Dunham, gave them message in the chart, verbalizes understanding.

## 2025-01-07 ENCOUNTER — TELEPHONE (OUTPATIENT)
Dept: FAMILY MEDICINE CLINIC | Facility: CLINIC | Age: 60
End: 2025-01-07
Payer: COMMERCIAL

## 2025-01-07 NOTE — TELEPHONE ENCOUNTER
JAYDA JOHNSON         Caller: Betty Dunham    Relationship: Self    Best call back number:     Betty Dunham (Self) [phone] (Mobile)       What was the call regarding:  PATIENT TRIED TO SCHEDULE WITH DR JOHNSON/ NEPHROLOGY REFERRAL BUT THEY STATED THAT THEIR OFFICE DOES NOT USE EPIC, AND COULD NOT SCHEDULE THE APPT     CAN YOU FIND OUT WHAT THEY NEED TO BE ABLE TO SCHEDULE THE PATIENT FOR NEPHROLOGY       Is it okay if the provider responds through MyChart: CALL BACK

## 2025-01-08 ENCOUNTER — TRANSCRIBE ORDERS (OUTPATIENT)
Dept: LAB | Facility: HOSPITAL | Age: 60
End: 2025-01-08
Payer: COMMERCIAL

## 2025-01-08 ENCOUNTER — LAB (OUTPATIENT)
Dept: LAB | Facility: HOSPITAL | Age: 60
End: 2025-01-08
Payer: COMMERCIAL

## 2025-01-08 ENCOUNTER — HOSPITAL ENCOUNTER (OUTPATIENT)
Dept: CARDIOLOGY | Facility: HOSPITAL | Age: 60
Discharge: HOME | End: 2025-01-08
Payer: COMMERCIAL

## 2025-01-08 DIAGNOSIS — Z01.818 PRE-OP TESTING: ICD-10-CM

## 2025-01-08 DIAGNOSIS — Z01.818 PRE-OP TESTING: Primary | ICD-10-CM

## 2025-01-08 DIAGNOSIS — R73.09 ELEVATED GLUCOSE: ICD-10-CM

## 2025-01-08 LAB
ALBUMIN SERPL-MCNC: 4.1 G/DL (ref 3.5–5.2)
ANION GAP SERPL CALCULATED.3IONS-SCNC: 11 MMOL/L (ref 5–15)
BASOPHILS # BLD AUTO: 0.03 10*3/MM3 (ref 0–0.2)
BASOPHILS NFR BLD AUTO: 0.5 % (ref 0–1.5)
BUN SERPL-MCNC: 20 MG/DL (ref 8–23)
BUN/CREAT SERPL: 18.2 (ref 7–25)
CALCIUM SPEC-SCNC: 9.2 MG/DL (ref 8.6–10.5)
CHLORIDE SERPL-SCNC: 104 MMOL/L (ref 98–107)
CO2 SERPL-SCNC: 27 MMOL/L (ref 22–29)
CREAT SERPL-MCNC: 1.1 MG/DL (ref 0.57–1)
DEPRECATED RDW RBC AUTO: 39.9 FL (ref 37–54)
EGFRCR SERPLBLD CKD-EPI 2021: 57.3 ML/MIN/1.73
EOSINOPHIL # BLD AUTO: 0.14 10*3/MM3 (ref 0–0.4)
EOSINOPHIL NFR BLD AUTO: 2.2 % (ref 0.3–6.2)
ERYTHROCYTE [DISTWIDTH] IN BLOOD BY AUTOMATED COUNT: 11.9 % (ref 12.3–15.4)
GLUCOSE SERPL-MCNC: 92 MG/DL (ref 65–99)
HBA1C MFR BLD: 5.54 % (ref 4.8–5.6)
HCT VFR BLD AUTO: 42.1 % (ref 34–46.6)
HGB BLD-MCNC: 13.4 G/DL (ref 12–15.9)
IMM GRANULOCYTES # BLD AUTO: 0.01 10*3/MM3 (ref 0–0.05)
IMM GRANULOCYTES NFR BLD AUTO: 0.2 % (ref 0–0.5)
LYMPHOCYTES # BLD AUTO: 2.09 10*3/MM3 (ref 0.7–3.1)
LYMPHOCYTES NFR BLD AUTO: 32.4 % (ref 19.6–45.3)
MCH RBC QN AUTO: 29.3 PG (ref 26.6–33)
MCHC RBC AUTO-ENTMCNC: 31.8 G/DL (ref 31.5–35.7)
MCV RBC AUTO: 91.9 FL (ref 79–97)
MONOCYTES # BLD AUTO: 0.29 10*3/MM3 (ref 0.1–0.9)
MONOCYTES NFR BLD AUTO: 4.5 % (ref 5–12)
MRSA DNA SPEC QL NAA+PROBE: NORMAL
NEUTROPHILS NFR BLD AUTO: 3.9 10*3/MM3 (ref 1.7–7)
NEUTROPHILS NFR BLD AUTO: 60.2 % (ref 42.7–76)
NRBC BLD AUTO-RTO: 0 /100 WBC (ref 0–0.2)
PLATELET # BLD AUTO: 257 10*3/MM3 (ref 140–450)
PMV BLD AUTO: 9.2 FL (ref 6–12)
POTASSIUM SERPL-SCNC: 4.5 MMOL/L (ref 3.5–5.2)
RBC # BLD AUTO: 4.58 10*6/MM3 (ref 3.77–5.28)
SODIUM SERPL-SCNC: 142 MMOL/L (ref 136–145)
WBC NRBC COR # BLD AUTO: 6.46 10*3/MM3 (ref 3.4–10.8)

## 2025-01-08 PROCEDURE — 36415 COLL VENOUS BLD VENIPUNCTURE: CPT

## 2025-01-08 PROCEDURE — 83036 HEMOGLOBIN GLYCOSYLATED A1C: CPT

## 2025-01-08 PROCEDURE — 93005 ELECTROCARDIOGRAM TRACING: CPT | Performed by: ORTHOPAEDIC SURGERY

## 2025-01-08 PROCEDURE — 82040 ASSAY OF SERUM ALBUMIN: CPT

## 2025-01-08 PROCEDURE — 85025 COMPLETE CBC W/AUTO DIFF WBC: CPT

## 2025-01-08 PROCEDURE — 80048 BASIC METABOLIC PNL TOTAL CA: CPT

## 2025-01-08 PROCEDURE — 87641 MR-STAPH DNA AMP PROBE: CPT

## 2025-01-09 LAB
QT INTERVAL: 371 MS
QTC INTERVAL: 405 MS

## 2025-01-28 ENCOUNTER — TRANSCRIBE ORDERS (OUTPATIENT)
Dept: PHYSICAL THERAPY | Facility: CLINIC | Age: 60
End: 2025-01-28
Payer: COMMERCIAL

## 2025-01-28 DIAGNOSIS — Z96.652 STATUS POST TOTAL KNEE REPLACEMENT, LEFT: Primary | ICD-10-CM

## 2025-02-10 ENCOUNTER — TREATMENT (OUTPATIENT)
Dept: PHYSICAL THERAPY | Facility: CLINIC | Age: 60
End: 2025-02-10
Payer: COMMERCIAL

## 2025-02-10 DIAGNOSIS — M25.562 ACUTE PAIN OF LEFT KNEE: Primary | ICD-10-CM

## 2025-02-10 DIAGNOSIS — Z96.652 S/P TKR (TOTAL KNEE REPLACEMENT), LEFT: ICD-10-CM

## 2025-02-10 PROCEDURE — 97140 MANUAL THERAPY 1/> REGIONS: CPT | Performed by: PHYSICAL THERAPIST

## 2025-02-10 PROCEDURE — 97112 NEUROMUSCULAR REEDUCATION: CPT | Performed by: PHYSICAL THERAPIST

## 2025-02-10 PROCEDURE — 97162 PT EVAL MOD COMPLEX 30 MIN: CPT | Performed by: PHYSICAL THERAPIST

## 2025-02-10 PROCEDURE — 97110 THERAPEUTIC EXERCISES: CPT | Performed by: PHYSICAL THERAPIST

## 2025-02-10 NOTE — PROGRESS NOTES
Physical Therapy Initial Evaluation and Plan of Care    Patient: Betty Dunham   : 1965  Diagnosis/ICD-10 Code:  Acute pain of left knee [M25.562]  Referring practitioner: Janes Christy MD  Date of Initial Visit: 2/10/2025  Today's Date: 2/10/2025  Patient seen for 1 sessions           Subjective Questionnaire:  Knee outcome survey:  50%      Subjective Evaluation    History of Present Illness  Date of surgery: 2/3/2025  Mechanism of injury: Patient presents to physical therapy s/p left total knee replacement.  Patient reports that she is a  and hopes to return to school as soon as possible.  Currently walking with rolling walker.  Hopes to normalize gait pattern and restore muscular strength in LLE so that she may return to walking/hiking without limitation.          Patient Occupation:  Pain  Current pain rating: 3  Location: left knee  Quality: dull ache and discomfort  Relieving factors: ice, medications and rest  Aggravating factors: ambulation, squatting, stairs, standing and lifting  Progression: improved    Social Support  Lives in: multiple-level home  Lives with: spouse    Patient Goals  Patient goals for therapy: increased strength, decreased pain, return to work, return to sport/leisure activities, decreased edema and increased motion           Objective          Observations     Additional Knee Observation Details  Incision appears to be healing properly (zip line still in place)    Tenderness   Left Knee   Tenderness in the lateral joint line, medial joint line, patellar tendon and quadriceps tendon.     Active Range of Motion   Left Knee   Flexion: 95 degrees with pain  Extension: 8 degrees     Additional Active Range of Motion Details  106 degrees left knee flexion after manual therapy    Patellar Mobility   Left Knee Hypomobile in the left medial and left inferior patellar tendon(s).     Strength/Myotome Testing     Left Knee   Flexion: 4-  Extension:  3+    Ambulation     Ambulation: Stairs   Pattern: non-reciprocal  Railings: one rail  Pattern: non-reciprocal  Railings: one rail    Observational Gait   Gait: antalgic   Decreased left stance time and left step length.     Additional Observational Gait Details  Ambulating with RW due to quad weakness          Assessment & Plan       Assessment  Impairments: abnormal gait, abnormal or restricted ROM, activity intolerance, impaired physical strength, lacks appropriate home exercise program, pain with function and weight-bearing intolerance   Functional limitations: lifting, walking, pulling, pushing, uncomfortable because of pain and standing   Assessment details: Patient presents to physical therapy s/p left total knee replacement.  Patient demonstrates limitation in left knee AROM, weakness in LLE and abnormal gait pattern.  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  Prognosis: good    Goals  Plan Goals: In three weeks, patient will report at least 25% reduction in pain level with prolonged standing more than 15 minutes in order to demonstrate readiness to return to school.   In three weeks, patient will demonstrate left knee AROM to at least 5-110 degrees.      In six weeks, patient will demonstrate at least 4+/5 muscular strength in LLE in order to demonstrate ability to ascend/descend stairs with reciprocal pattern.   In six weeks, patient will demonstrate at least 50% improvement in gait mechanics.  In six weeks, patient will demonstrate left knee AROM of at least 5-115 degrees.  In six weeks, patient will demonstrate decreased perceived disability by increasing score on knee outcome survey by at least 15%      Plan  Therapy options: will be seen for skilled therapy services  Planned modality interventions: cryotherapy, TENS and thermotherapy (hydrocollator packs)  Planned therapy  interventions: manual therapy, neuromuscular re-education, soft tissue mobilization, strengthening, stretching, therapeutic activities, home exercise program, joint mobilization, gait training, functional ROM exercises, flexibility and balance/weight-bearing training  Frequency: 3x week  Duration in weeks: 6  Treatment plan discussed with: patient        Manual Therapy:    8     mins  78604;  Therapeutic Exercise:    23     mins  70373;     Neuromuscular [place]:    10    mins  08355;    Therapeutic Activity:          mins  95249;     Gait Trainin     mins  68133;     Ultrasound:          mins  31811;    Electrical Stimulation:         mins  82899 ( );  Dry Needling          mins self-pay    Timed Treatment:   46   mins   Total Treatment:     62   mins    PT SIGNATURE: Jann Sigala PT   DATE TREATMENT INITIATED: 2/10/2025    Initial Certification  Certification Period: 2025  I certify that the therapy services are furnished while this patient is under my care.  The services outlined above are required by this patient, and will be reviewed every 90 days.     PHYSICIAN: Janes Christy MD      DATE:     Please sign and return via fax to [phone].. Thank you, [facility].

## 2025-02-12 ENCOUNTER — TREATMENT (OUTPATIENT)
Dept: PHYSICAL THERAPY | Facility: CLINIC | Age: 60
End: 2025-02-12
Payer: COMMERCIAL

## 2025-02-12 DIAGNOSIS — Z96.652 S/P TKR (TOTAL KNEE REPLACEMENT), LEFT: ICD-10-CM

## 2025-02-12 DIAGNOSIS — M25.562 ACUTE PAIN OF LEFT KNEE: Primary | ICD-10-CM

## 2025-02-12 NOTE — PROGRESS NOTES
Physical Therapy Daily Progress Note    Patient: Betty Dunham   : 1965  Diagnosis/ICD-10 Code:  Acute pain of left knee [M25.562]  Referring practitioner: Janes Christy MD  Date of Initial Visit: Type: THERAPY  Noted: 2/10/2025  Today's Date: 2025  Patient seen for 2 sessions         Betty Dunham reports:  Left knee has been sore this week.  Reports that she has been able to complete straight leg raises over the past few days due to improving strength in LLE.      Objective   See Exercise, Manual, and Modality Logs for complete treatment.     107 degrees left knee flexion AROM after manual therapy    Assessment/Plan    Tolerates exercise progression and manual therapy well this visit.  Feeling well at end of visit.    Progress per Plan of Care           Manual Therapy:    8     mins  85687;  Therapeutic Exercise:    25     mins  57321;     Neuromuscular [place]:    10    mins  55902;    Therapeutic Activity:          mins  40401;     Gait Training:           mins  38706;     Ultrasound:          mins  50320;    Electrical Stimulation:         mins  85592 ( );  Dry Needling          mins self-pay    Timed Treatment:   43   mins   Total Treatment:     43   mins    Jann Sigala PT  Physical Therapist

## 2025-02-14 ENCOUNTER — TREATMENT (OUTPATIENT)
Dept: PHYSICAL THERAPY | Facility: CLINIC | Age: 60
End: 2025-02-14
Payer: COMMERCIAL

## 2025-02-14 DIAGNOSIS — Z96.652 S/P TKR (TOTAL KNEE REPLACEMENT), LEFT: ICD-10-CM

## 2025-02-14 DIAGNOSIS — M25.562 ACUTE PAIN OF LEFT KNEE: Primary | ICD-10-CM

## 2025-02-14 NOTE — PROGRESS NOTES
Physical Therapy Daily Progress Note    Patient: Betty Dunham   : 1965  Diagnosis/ICD-10 Code:  Acute pain of left knee [M25.562]  Referring practitioner: Janes Christy MD  Date of Initial Visit: Type: THERAPY  Noted: 2/10/2025  Today's Date: 2025  Patient seen for 3 sessions         Betty Dunham reports:   Left knee feeling better.  But having some pain in left posterior hip over the past few days.     Objective   See Exercise, Manual, and Modality Logs for complete treatment.     114 degrees left knee flexion AROM after manual therapy    Assessment/Plan    Feeling well after manual therapy this visit.  Noted improved flexion AROM in left knee this visit, however limitation with left knee extension AROM still present.  Good tolerance with exercise progression.     Progress per Plan of Care           Manual Therapy:    10     mins  96391;  Therapeutic Exercise:    40     mins  49901;     Neuromuscular [place]:    10    mins  69742;    Therapeutic Activity:          mins  65210;     Gait Training:           mins  58422;     Ultrasound:          mins  92013;    Electrical Stimulation:         mins  24126 ( );  Dry Needling          mins self-pay    Timed Treatment:   60   mins   Total Treatment:     60   mins    Jann Sigala PT  Physical Therapist

## 2025-02-17 ENCOUNTER — TREATMENT (OUTPATIENT)
Dept: PHYSICAL THERAPY | Facility: CLINIC | Age: 60
End: 2025-02-17
Payer: COMMERCIAL

## 2025-02-17 DIAGNOSIS — M25.562 ACUTE PAIN OF LEFT KNEE: Primary | ICD-10-CM

## 2025-02-17 DIAGNOSIS — Z96.652 S/P TKR (TOTAL KNEE REPLACEMENT), LEFT: ICD-10-CM

## 2025-02-17 PROCEDURE — 97112 NEUROMUSCULAR REEDUCATION: CPT | Performed by: PHYSICAL THERAPIST

## 2025-02-17 PROCEDURE — 97110 THERAPEUTIC EXERCISES: CPT | Performed by: PHYSICAL THERAPIST

## 2025-02-17 PROCEDURE — 97140 MANUAL THERAPY 1/> REGIONS: CPT | Performed by: PHYSICAL THERAPIST

## 2025-02-17 PROCEDURE — 97530 THERAPEUTIC ACTIVITIES: CPT | Performed by: PHYSICAL THERAPIST

## 2025-02-17 NOTE — PROGRESS NOTES
Physical Therapy Daily Progress Note    Patient: Betty Dunham   : 1965  Diagnosis/ICD-10 Code:  Acute pain of left knee [M25.562]  Referring practitioner: Janes Christy MD  Date of Initial Visit: Type: THERAPY  Noted: 2/10/2025  Today's Date: 2025  Patient seen for 4 sessions         Betty Dunham reports:  Left knee feeling stiff over the weekend, but overall feels that pain is less.  Compliant with HEP.     Objective   See Exercise, Manual, and Modality Logs for complete treatment.     8-119 degrees left knee AROM after manual therapy    Assessment/Plan    Continued improvement in left knee AROM.  Has follow up with MD on Thursday.  Progressing well.     Progress per Plan of Care           Manual Therapy:    10     mins  94097;  Therapeutic Exercise:    15     mins  16658;     Neuromuscular [place]:    10    mins  77395;    Therapeutic Activity:      10    mins  97990;     Gait Training:           mins  63523;     Ultrasound:          mins  73434;    Electrical Stimulation:         mins  64205 ( );  Dry Needling          mins self-pay    Timed Treatment:   45   mins   Total Treatment:     45   mins    Jann Sigala PT  Physical Therapist

## 2025-02-19 ENCOUNTER — TREATMENT (OUTPATIENT)
Dept: PHYSICAL THERAPY | Facility: CLINIC | Age: 60
End: 2025-02-19
Payer: COMMERCIAL

## 2025-02-19 DIAGNOSIS — M25.562 ACUTE PAIN OF LEFT KNEE: Primary | ICD-10-CM

## 2025-02-19 DIAGNOSIS — Z96.652 S/P TKR (TOTAL KNEE REPLACEMENT), LEFT: ICD-10-CM

## 2025-02-19 NOTE — PROGRESS NOTES
Physical Therapy Daily Progress Note    Patient: Betty Dunham   : 1965  Diagnosis/ICD-10 Code:  Acute pain of left knee [M25.562]  Referring practitioner: Janes Christy MD  Date of Initial Visit: Type: THERAPY  Noted: 2/10/2025  Today's Date: 2025  Patient seen for 5 sessions         Betty Dunham reports:  Left knee is sore around patella.  Reports that she is still limited with standing tolerance, but feels that her mobility is improving.     Objective   See Exercise, Manual, and Modality Logs for complete treatment.     8-121 degrees left knee AROM after manual therapy    Assessment/Plan    Continued improvement in left knee flexion AROM.  Patient tolerates strengthening activities well this visit.  Progressing well.     Progress per Plan of Care           Manual Therapy:    10     mins  74904;  Therapeutic Exercise:    15     mins  63301;     Neuromuscular [place]:    10    mins  32357;    Therapeutic Activity:      10    mins  91658;     Gait Training:           mins  72643;     Ultrasound:          mins  22686;    Electrical Stimulation:         mins  18148 ( );  Dry Needling          mins self-pay    Timed Treatment:   45   mins   Total Treatment:     45   mins    Jann Sigala PT  Physical Therapist

## 2025-02-21 ENCOUNTER — TREATMENT (OUTPATIENT)
Dept: PHYSICAL THERAPY | Facility: CLINIC | Age: 60
End: 2025-02-21
Payer: COMMERCIAL

## 2025-02-21 DIAGNOSIS — M25.562 ACUTE PAIN OF LEFT KNEE: Primary | ICD-10-CM

## 2025-02-21 DIAGNOSIS — Z96.652 S/P TKR (TOTAL KNEE REPLACEMENT), LEFT: ICD-10-CM

## 2025-02-21 NOTE — PROGRESS NOTES
Physical Therapy Daily Progress Note    Patient: Betty Dunham   : 1965  Diagnosis/ICD-10 Code:  Acute pain of left knee [M25.562]  Referring practitioner: Janes Christy MD  Date of Initial Visit: Type: THERAPY  Noted: 2/10/2025  Today's Date: 2025  Patient seen for 6 sessions         Betty Dunham reports:  Had zip line removed yesterday and feeling well.  Reports still some soreness surrounding left patella.  Motion feels improved, but still getting pain when prolonged walking.     Objective   See Exercise, Manual, and Modality Logs for complete treatment.     8-121 degrees left knee AROM after manual therapy    Assessment/Plan    Good tolerance with IASTM surrounding left patella this visit.  Feeling well after exercise progression this visit.  Fatigue in LLE after exercises.     Progress per Plan of Care           Manual Therapy:    10     mins  13090;  Therapeutic Exercise:    15     mins  00167;     Neuromuscular [place]:    10    mins  34687;    Therapeutic Activity:      10    mins  09302;     Gait Training:           mins  55201;     Ultrasound:          mins  52417;    Electrical Stimulation:         mins  29233 ( );  Dry Needling          mins self-pay    Timed Treatment:   45   mins   Total Treatment:     45   mins    Jann Sigala PT  Physical Therapist

## 2025-02-24 ENCOUNTER — TREATMENT (OUTPATIENT)
Dept: PHYSICAL THERAPY | Facility: CLINIC | Age: 60
End: 2025-02-24
Payer: COMMERCIAL

## 2025-02-24 DIAGNOSIS — M25.562 ACUTE PAIN OF LEFT KNEE: Primary | ICD-10-CM

## 2025-02-24 DIAGNOSIS — Z96.652 S/P TKR (TOTAL KNEE REPLACEMENT), LEFT: ICD-10-CM

## 2025-02-24 PROCEDURE — 97110 THERAPEUTIC EXERCISES: CPT | Performed by: PHYSICAL THERAPIST

## 2025-02-24 PROCEDURE — 97112 NEUROMUSCULAR REEDUCATION: CPT | Performed by: PHYSICAL THERAPIST

## 2025-02-24 PROCEDURE — 97530 THERAPEUTIC ACTIVITIES: CPT | Performed by: PHYSICAL THERAPIST

## 2025-02-24 PROCEDURE — 97140 MANUAL THERAPY 1/> REGIONS: CPT | Performed by: PHYSICAL THERAPIST

## 2025-02-24 NOTE — PROGRESS NOTES
Physical Therapy Daily Progress Note    Patient: Betty Dunham   : 1965  Diagnosis/ICD-10 Code:  Acute pain of left knee [M25.562]  Referring practitioner: Janes Christy MD  Date of Initial Visit: Type: THERAPY  Noted: 2/10/2025  Today's Date: 2025  Patient seen for 7 sessions         Betty Dunham reports:  Left knee still sore at medial joint line, but feeling well with walking.  Feels better with ascending/descending stairs.    Objective   See Exercise, Manual, and Modality Logs for complete treatment.     Assessment/Plan    Feeling well after manual therapy and exercise this visit.  AROM progressing well, strength in LLE still limited.  Progressing well towards goals.     Progress per Plan of Care           Manual Therapy:    10     mins  85429;  Therapeutic Exercise:    15     mins  17405;     Neuromuscular [place]:    10    mins  34586;    Therapeutic Activity:      10    mins  71022;     Gait Training:           mins  67097;     Ultrasound:          mins  90809;    Electrical Stimulation:         mins  26184 ( );  Dry Needling          mins self-pay    Timed Treatment:   45   mins   Total Treatment:     45   mins    Jann Sigala PT  Physical Therapist

## 2025-02-26 ENCOUNTER — TREATMENT (OUTPATIENT)
Dept: PHYSICAL THERAPY | Facility: CLINIC | Age: 60
End: 2025-02-26
Payer: COMMERCIAL

## 2025-02-26 DIAGNOSIS — Z96.652 S/P TKR (TOTAL KNEE REPLACEMENT), LEFT: ICD-10-CM

## 2025-02-26 DIAGNOSIS — M25.562 ACUTE PAIN OF LEFT KNEE: Primary | ICD-10-CM

## 2025-02-26 NOTE — PROGRESS NOTES
Physical Therapy Daily Progress Note      Patient: Betty Dunham   : 1965  Diagnosis/ICD-10 Code:  Acute pain of left knee [M25.562]  Referring practitioner: Janes Christy MD  Date of Initial Visit: Type: THERAPY  Noted: 2/10/2025  Today's Date: 2025  Patient seen for 8 sessions             Subjective Knee is feeling better overall, but still has c/o stiffness.  She also notes not being able to sleep well because she can't get comfortable.    Objective   See Exercise, Manual, and Modality Logs for complete treatment.       Assessment/Plan  Pt's knee flexion ROM is doing well.  She is more limited on knee extension at this time.  Initiated hamstring and gastroc stretches to help assist in this improvement.    Progress per Plan of Care           Timed:         Manual Therapy:    10     mins  76110;     Therapeutic Exercise:    15     mins  76631;     Neuromuscular [place]:    10    mins  97671;    Therapeutic Activity:     10     mins  92876;         Timed Treatment:   45   mins   Total Treatment:     45   mins        Phani Nolasco PTA  Physical Therapist Assistant

## 2025-02-28 ENCOUNTER — TREATMENT (OUTPATIENT)
Dept: PHYSICAL THERAPY | Facility: CLINIC | Age: 60
End: 2025-02-28
Payer: COMMERCIAL

## 2025-02-28 DIAGNOSIS — M25.562 ACUTE PAIN OF LEFT KNEE: Primary | ICD-10-CM

## 2025-02-28 DIAGNOSIS — Z96.652 S/P TKR (TOTAL KNEE REPLACEMENT), LEFT: ICD-10-CM

## 2025-02-28 NOTE — PROGRESS NOTES
Physical Therapy Daily Progress Note    Patient: Betty Dunham   : 1965  Diagnosis/ICD-10 Code:  Acute pain of left knee [M25.562]  Referring practitioner: Janes Christy MD  Date of Initial Visit: Type: THERAPY  Noted: 2/10/2025  Today's Date: 2025  Patient seen for 9 sessions         Betty Dunham reports:  Left knee feeling better.  Still getting some nerve pain at night.  Compliant with HEP and has been walking more.  Plans to return to work next week.    Objective   See Exercise, Manual, and Modality Logs for complete treatment.     7-125 degrees left knee AROM    Assessment/Plan    Good tolerance with manual therapy and exercises.  Demonstrates proper technique with home exercises reviewed this visit.  Noted slight knee flexion during stance phase of gait of LLE, but improvement noted during treadmill walking with verbal cues during session.     Progress per Plan of Care           Manual Therapy:    10     mins  77825;  Therapeutic Exercise:    10     mins  63968;     Neuromuscular [place]:    10    mins  33742;    Therapeutic Activity:         mins  81582;     Gait Trainin    mins  56337;     Ultrasound:          mins  09569;    Electrical Stimulation:         mins  63022 ( );  Dry Needling          mins self-pay    Timed Treatment:   38   mins   Total Treatment:     38   mins    Jann Sigala PT  Physical Therapist

## 2025-03-04 ENCOUNTER — TREATMENT (OUTPATIENT)
Dept: PHYSICAL THERAPY | Facility: CLINIC | Age: 60
End: 2025-03-04
Payer: COMMERCIAL

## 2025-03-04 DIAGNOSIS — M25.562 ACUTE PAIN OF LEFT KNEE: Primary | ICD-10-CM

## 2025-03-04 DIAGNOSIS — Z96.652 S/P TKR (TOTAL KNEE REPLACEMENT), LEFT: ICD-10-CM

## 2025-03-04 NOTE — PROGRESS NOTES
Physical Therapy Daily Progress Note    Patient: Betty Dunham   : 1965  Diagnosis/ICD-10 Code:  Acute pain of left knee [M25.562]  Referring practitioner: Janes Christy MD  Date of Initial Visit: Type: THERAPY  Noted: 2/10/2025  Today's Date: 3/4/2025  Patient seen for 10 sessions         Betty Dunham reports: Left knee continues to feel well.  Still getting some stiffness when trying to straighten knee, but overall feeling better.  Returning to work this week.    Objective   See Exercise, Manual, and Modality Logs for complete treatment.      degrees left knee AROM    Assessment/Plan    Patient demonstrates proper technique with home exercises.  Patient to continue with I HEP, but may return for treatment if stiffness/pain increases.         Manual Therapy:         mins  54086;  Therapeutic Exercise:    30     mins  05128;     Neuromuscular [place]:        mins  12823;    Therapeutic Activity:          mins  05504;     Gait Training:           mins  64939;     Ultrasound:          mins  89673;    Electrical Stimulation:         mins  31090 ( );  Dry Needling          mins self-pay    Timed Treatment:   30   mins   Total Treatment:     30   mins    Jann Sigala PT  Physical Therapist

## 2025-04-14 DIAGNOSIS — Z12.31 SCREENING MAMMOGRAM FOR BREAST CANCER: Primary | ICD-10-CM

## 2025-04-18 NOTE — PROGRESS NOTES
HEMATOLOGY ONCOLOGY OUTPATIENT CONSULTATION       Patient name: Betty Dunham  : 1965  MRN: [id number]  Primary Care Physician: Rei Montes De Oca DO  Referring Physician: Martir Byrnes MD  Reason For Consult:       History of Present Illness:    History of Present Illness  Patient is a 60-year-old female who has been referred to us for further evaluation and management of monoclonal gammopathy of undetermined significance.    Outside labs are not available at this time, but as per notes from nephrology, she had ?  Monoclonal lambda light chain on serum immunofixation.    Available recent labs are as follows:    2025:  CBC: 6.4/13.4/42.1/257  BMP: Significant for BUN/creatinine  [GFR 50.1]      She was referred to our care by Dr. Byrnes, a nephrologist, whom she has consulted once approximately 1.5 months ago. During that visit, blood work was conducted which was reportedly concerning for presence of monoclonal protein, but the results are not available at this time.   Over the past 4 to 5 years, her annual check-ups have consistently abnormal  elevated kidney function levels. No history of hypertension, diabetes, cardiac disease, or stroke is reported. There is no history of anemia, and she has never been on any nutritional supplements. A history of histoplasmosis, diagnosed 20 years ago, is noted.    A long-standing history of headaches is reported, dating back to her pregnancy with twins 26 years ago. Regular use of pain medication is not reported, but she was previously advised by her neurologist to take over-the-counter migraine medication, which she used frequently in the past. She has never taken naproxen or diclofenac. Previously on omeprazole, she has since switched to Pepcid as per Dr. Byrnes's recommendation.    PAST SURGICAL HISTORY:  She underwent a total left knee replacement 10 weeks ago and has since made a full functional recovery. A hysterectomy  has also been performed, and she does not undergo Pap smears but maintains regular mammogram screenings.      Subjective:  25: pt seen today for initial evaluation . No acute complaints reported.      Past Medical History:   Diagnosis Date    Arthritis     Migraine     Sleep apnea        Past Surgical History:   Procedure Laterality Date    APPENDECTOMY       SECTION      COLONOSCOPY N/A 2019    Procedure: COLONOSCOPY with polypectomy x2;  Surgeon: Dominic Cameron MD;  Location: [facility] END[facility];  Service: Gastroenterology    HYSTERECTOMY      KNEE ARTHROSCOPY Right     NO PAST SURGERIES      SINUS SURGERY           Current Outpatient Medications:     rizatriptan MLT (MAXALT-MLT) 10 MG disintegrating tablet, Place 1 tablet on the tongue 1 (One) Time As Needed for Migraine. May repeat in 2 hr if needed max 2 per day, Disp: 12 tablet, Rfl: 11    Semaglutide-Weight Management 0.25 MG/0.5ML solution auto-injector, Inject 0.5 mL under the skin into the appropriate area as directed 1 (One) Time Per Week., Disp: 2 mL, Rfl: 2    No Known Allergies    Family History   Problem Relation Age of Onset    Parkinsonism Father     Hyperlipidemia Father     Stroke Father     Colon cancer Maternal Grandmother        Cancer-related family history includes Colon cancer in her maternal grandmother.      Social History     Tobacco Use    Smoking status: Former     Current packs/day: 0.00     Average packs/day: 1 pack/day for 13.0 years (13.0 ttl pk-yrs)     Types: Cigarettes     Start date: 1985     Quit date:      Years since quittin.3    Smokeless tobacco: Never    Tobacco comments:     24 yrs ago   Vaping Use    Vaping status: Never Used   Substance Use Topics    Alcohol use: Not Currently     Comment: 3 times per year    Drug use: No     Social History     Social History Narrative    Not on file       ROS:   Review of Systems   Constitutional: Negative.    HENT: Negative.     Eyes: Negative.   "  Respiratory: Negative.     Cardiovascular: Negative.    Gastrointestinal: Negative.    Endocrine: Negative.    Genitourinary: Negative.    Musculoskeletal: Negative.    Skin: Negative.    Allergic/Immunologic: Negative.    Neurological: Negative.    Hematological: Negative.    Psychiatric/Behavioral: Negative.           Objective:    Vital Signs:  Vitals:    04/23/25 0903   BP: 133/86   Pulse: 66   SpO2: 98%   Weight: 78.2 kg (172 lb 6.4 oz)   Height: 166.4 cm (65.5\")   PainSc: 0-No pain     Body mass index is 28.25 kg/m².    ECOG  (1) Restricted in physically strenuous activity, ambulatory and able to do work of light nature    Physical Exam:   Physical Exam  Constitutional:       Appearance: Normal appearance. She is normal weight.   HENT:      Head: Normocephalic and atraumatic.      Right Ear: External ear normal.      Left Ear: External ear normal.      Nose: Nose normal.      Mouth/Throat:      Mouth: Mucous membranes are moist.      Pharynx: Oropharynx is clear.   Eyes:      Extraocular Movements: Extraocular movements intact.      Conjunctiva/sclera: Conjunctivae normal.      Pupils: Pupils are equal, round, and reactive to light.   Cardiovascular:      Rate and Rhythm: Normal rate.      Pulses: Normal pulses.   Pulmonary:      Effort: Pulmonary effort is normal.   Abdominal:      General: Abdomen is flat.      Palpations: Abdomen is soft.   Musculoskeletal:         General: Normal range of motion.      Cervical back: Normal range of motion and neck supple.   Skin:     General: Skin is warm.   Neurological:      Mental Status: She is alert.   Psychiatric:         Mood and Affect: Mood normal.         Behavior: Behavior normal.         Thought Content: Thought content normal.         Judgment: Judgment normal.         Lab Results - Last 18 Months   Lab Units 01/08/25  1307 04/09/24  1630   WBC 10*3/mm3 6.46 8.50   HEMOGLOBIN g/dL 13.4 13.9   HEMATOCRIT % 42.1 41.9   PLATELETS 10*3/mm3 257 257   MCV fL 91.9 " "89.9     Lab Results - Last 18 Months   Lab Units 01/08/25  1307 12/12/24  1313 04/09/24  1630   SODIUM mmol/L 142 143 138   POTASSIUM mmol/L 4.5 4.4 4.7   CHLORIDE mmol/L 104 104 103   CO2 mmol/L 27.0 23.5 25.0   BUN mg/dL 20 21 19   CREATININE mg/dL 1.10* 1.23* 1.37*   CALCIUM mg/dL 9.2 9.3 9.2   BILIRUBIN mg/dL  --   --  0.3   ALK PHOS U/L  --   --  123*   ALT (SGPT) U/L  --   --  17   AST (SGOT) U/L  --   --  14   GLUCOSE mg/dL 92 87 88       Lab Results   Component Value Date    GLUCOSE 92 01/08/2025    BUN 20 01/08/2025    CREATININE 1.10 (H) 01/08/2025    EGFRIFNONA 45 (L) 07/21/2020    EGFRIFAFRI 55 (L) 03/09/2017    BCR 18.2 01/08/2025    K 4.5 01/08/2025    CO2 27.0 01/08/2025    CALCIUM 9.2 01/08/2025    ALBUMIN 4.1 01/08/2025    AST 14 04/09/2024    ALT 17 04/09/2024       No results for input(s): \"APTT\", \"INR\", \"PTT\" in the last 40629 hours.    No results found for: \"IRON\", \"TIBC\", \"FERRITIN\"    No results found for: \"FOLATE\"    No results found for: \"OCCULTBLD\"    No results found for: \"RETICCTPCT\"  No results found for: \"HNCCCYOH68\"  No results found for: \"SPEP\", \"UPEP\"  No results found for: \"LDH\", \"URICACID\"  No results found for: \"PAT\", \"RF\", \"SEDRATE\"  No results found for: \"FIBRINOGEN\", \"HAPTOGLOBIN\"  No results found for: \"PTT\", \"INR\"  No results found for: \"\"  No results found for: \"CEA\"  No components found for: \"CA-19-9\"  No results found for: \"PSA\"  No results found for: \"SEDRATE\"       Assessment & Plan       Assessment & Plan      Suspected MGUS:  -outside records reviewed, noted concern for Monoclonal protein/paraproteinemia, however lab results are not available.  -We discussed pathophysiology of Plasma cell disorders and various diseases on this spectrum including MGUS, Smoldering MM, Myeloma, AL Amyloidosis and POEMS syndrome etc  -I explained to the patient that most patients with MGUS do not progress to Myeloma with median risk of progression around 1-2% however this diagnosis " calls for routine surveillance.  -I Also explained to the patient the clinical/lab signs symptoms associated with Multiple Myeloma/Amyloidosis.   -noted presence of Stage III CKD without any obvious underlying risk factors/medical comorbidities.Familial predisposition and long-term medication use could be the potential contributors.   -CBC and CMP on outside labs are unremarkable with no concern for advanced disease at this time.  -Will check Repeat CBC, CMP, Plasma cell disorder workup including Urine JAYLAN/Protein Electrophoresis  -based on iSTOP MM risk score, he can be considered for a bone marrow biopsy.     2. Chronic headaches.  A long-standing history of headaches, dating back to pregnancy with twins 26 years ago. No regular use of pain medication, but previously advised by a neurologist to take over-the-counter migraine medication, which was used frequently in the past.    Follow-up  Follow-up in 3 to 4 weeks to discuss lab results and determine the next steps in the treatment plan.      Thank you very much for providing the opportunity to participate in this patient’s care. Please do not hesitate to call if there are any other questions.

## 2025-04-22 ENCOUNTER — PATIENT MESSAGE (OUTPATIENT)
Dept: FAMILY MEDICINE CLINIC | Facility: CLINIC | Age: 60
End: 2025-04-22
Payer: COMMERCIAL

## 2025-04-22 DIAGNOSIS — G43.009 MIGRAINE WITHOUT AURA AND WITHOUT STATUS MIGRAINOSUS, NOT INTRACTABLE: ICD-10-CM

## 2025-04-22 RX ORDER — RIZATRIPTAN BENZOATE 10 MG/1
10 TABLET, ORALLY DISINTEGRATING ORAL ONCE AS NEEDED
Qty: 12 TABLET | Refills: 11 | Status: SHIPPED | OUTPATIENT
Start: 2025-04-22

## 2025-04-23 ENCOUNTER — LAB (OUTPATIENT)
Dept: LAB | Facility: HOSPITAL | Age: 60
End: 2025-04-23
Payer: COMMERCIAL

## 2025-04-23 ENCOUNTER — CONSULT (OUTPATIENT)
Dept: ONCOLOGY | Facility: CLINIC | Age: 60
End: 2025-04-23
Payer: COMMERCIAL

## 2025-04-23 VITALS
BODY MASS INDEX: 27.71 KG/M2 | HEIGHT: 66 IN | SYSTOLIC BLOOD PRESSURE: 133 MMHG | WEIGHT: 172.4 LBS | HEART RATE: 66 BPM | OXYGEN SATURATION: 98 % | DIASTOLIC BLOOD PRESSURE: 86 MMHG

## 2025-04-23 DIAGNOSIS — D47.2 MGUS (MONOCLONAL GAMMOPATHY OF UNKNOWN SIGNIFICANCE): ICD-10-CM

## 2025-04-23 DIAGNOSIS — D47.2 MGUS (MONOCLONAL GAMMOPATHY OF UNKNOWN SIGNIFICANCE): Primary | ICD-10-CM

## 2025-04-23 DIAGNOSIS — N18.31 STAGE 3A CHRONIC KIDNEY DISEASE: ICD-10-CM

## 2025-04-23 LAB
ALBUMIN SERPL-MCNC: 4.4 G/DL (ref 3.5–5.2)
ALBUMIN/GLOB SERPL: 1.8 G/DL
ALP SERPL-CCNC: 100 U/L (ref 39–117)
ALT SERPL W P-5'-P-CCNC: 36 U/L (ref 1–33)
ANION GAP SERPL CALCULATED.3IONS-SCNC: 8.2 MMOL/L (ref 5–15)
AST SERPL-CCNC: 32 U/L (ref 1–32)
BASOPHILS # BLD AUTO: 0.02 10*3/MM3 (ref 0–0.2)
BASOPHILS NFR BLD AUTO: 0.4 % (ref 0–1.5)
BILIRUB SERPL-MCNC: 0.4 MG/DL (ref 0–1.2)
BUN SERPL-MCNC: 18 MG/DL (ref 8–23)
BUN/CREAT SERPL: 15.3 (ref 7–25)
CALCIUM SPEC-SCNC: 9.8 MG/DL (ref 8.6–10.5)
CHLORIDE SERPL-SCNC: 103 MMOL/L (ref 98–107)
CO2 SERPL-SCNC: 25.8 MMOL/L (ref 22–29)
CREAT SERPL-MCNC: 1.18 MG/DL (ref 0.57–1)
DEPRECATED RDW RBC AUTO: 39.8 FL (ref 37–54)
EGFRCR SERPLBLD CKD-EPI 2021: 52.3 ML/MIN/1.73
EOSINOPHIL # BLD AUTO: 0.1 10*3/MM3 (ref 0–0.4)
EOSINOPHIL NFR BLD AUTO: 2 % (ref 0.3–6.2)
ERYTHROCYTE [DISTWIDTH] IN BLOOD BY AUTOMATED COUNT: 12 % (ref 12.3–15.4)
FOLATE SERPL-MCNC: 14.2 NG/ML (ref 4.78–24.2)
GLOBULIN UR ELPH-MCNC: 2.4 GM/DL
GLUCOSE SERPL-MCNC: 95 MG/DL (ref 65–99)
HCT VFR BLD AUTO: 39.2 % (ref 34–46.6)
HGB BLD-MCNC: 12.9 G/DL (ref 12–15.9)
LDH SERPL-CCNC: 185 U/L (ref 135–214)
LYMPHOCYTES # BLD AUTO: 1.43 10*3/MM3 (ref 0.7–3.1)
LYMPHOCYTES NFR BLD AUTO: 29.3 % (ref 19.6–45.3)
MCH RBC QN AUTO: 30.3 PG (ref 26.6–33)
MCHC RBC AUTO-ENTMCNC: 32.9 G/DL (ref 31.5–35.7)
MCV RBC AUTO: 92 FL (ref 79–97)
MONOCYTES # BLD AUTO: 0.31 10*3/MM3 (ref 0.1–0.9)
MONOCYTES NFR BLD AUTO: 6.4 % (ref 5–12)
NEUTROPHILS NFR BLD AUTO: 3.02 10*3/MM3 (ref 1.7–7)
NEUTROPHILS NFR BLD AUTO: 61.9 % (ref 42.7–76)
PLATELET # BLD AUTO: 202 10*3/MM3 (ref 140–450)
PMV BLD AUTO: 8.8 FL (ref 6–12)
POTASSIUM SERPL-SCNC: 4.4 MMOL/L (ref 3.5–5.2)
PROT SERPL-MCNC: 6.8 G/DL (ref 6–8.5)
RBC # BLD AUTO: 4.26 10*6/MM3 (ref 3.77–5.28)
RETICS # AUTO: 0.08 10*6/MM3 (ref 0.02–0.13)
RETICS/RBC NFR AUTO: 1.76 % (ref 0.7–1.9)
SODIUM SERPL-SCNC: 137 MMOL/L (ref 136–145)
VIT B12 BLD-MCNC: 799 PG/ML (ref 211–946)
WBC NRBC COR # BLD AUTO: 4.88 10*3/MM3 (ref 3.4–10.8)

## 2025-04-23 PROCEDURE — 85025 COMPLETE CBC W/AUTO DIFF WBC: CPT

## 2025-04-23 PROCEDURE — 80053 COMPREHEN METABOLIC PANEL: CPT | Performed by: STUDENT IN AN ORGANIZED HEALTH CARE EDUCATION/TRAINING PROGRAM

## 2025-04-23 PROCEDURE — 36415 COLL VENOUS BLD VENIPUNCTURE: CPT

## 2025-04-23 PROCEDURE — 82746 ASSAY OF FOLIC ACID SERUM: CPT | Performed by: STUDENT IN AN ORGANIZED HEALTH CARE EDUCATION/TRAINING PROGRAM

## 2025-04-23 PROCEDURE — 83615 LACTATE (LD) (LDH) ENZYME: CPT | Performed by: STUDENT IN AN ORGANIZED HEALTH CARE EDUCATION/TRAINING PROGRAM

## 2025-04-23 PROCEDURE — 82607 VITAMIN B-12: CPT | Performed by: STUDENT IN AN ORGANIZED HEALTH CARE EDUCATION/TRAINING PROGRAM

## 2025-04-23 PROCEDURE — 99204 OFFICE O/P NEW MOD 45 MIN: CPT | Performed by: STUDENT IN AN ORGANIZED HEALTH CARE EDUCATION/TRAINING PROGRAM

## 2025-04-23 PROCEDURE — 85045 AUTOMATED RETICULOCYTE COUNT: CPT | Performed by: STUDENT IN AN ORGANIZED HEALTH CARE EDUCATION/TRAINING PROGRAM

## 2025-04-23 RX ORDER — GABAPENTIN 100 MG/1
CAPSULE ORAL
COMMUNITY
Start: 2025-02-02

## 2025-04-24 ENCOUNTER — PATIENT ROUNDING (BHMG ONLY) (OUTPATIENT)
Dept: ONCOLOGY | Facility: CLINIC | Age: 60
End: 2025-04-24
Payer: COMMERCIAL

## 2025-04-24 NOTE — PROGRESS NOTES
April 24, 2025    Hello, may I speak with Betty Dunham?    My name is Leighann Pickens      I am  with [facility]  [address] [place] [address] [phone].    Before we get started may I verify your date of birth? 1965    I am calling to officially welcome you to our practice and ask about your recent visit. Is this a good time to talk? no    Tell me about your visit with us. What things went well?  A My Chart message was sent to the patient.         We're always looking for ways to make our patients' experiences even better. Do you have recommendations on ways we may improve?  no    Overall were you satisfied with your first visit to our practice? yes       I appreciate you taking the time to speak with me today. Is there anything else I can do for you? no      Thank you, and have a great day.

## 2025-04-25 LAB
ALBUMIN SERPL ELPH-MCNC: 3.7 G/DL (ref 2.9–4.4)
ALBUMIN/GLOB SERPL: 1.3 {RATIO} (ref 0.7–1.7)
ALPHA1 GLOB SERPL ELPH-MCNC: 0.2 G/DL (ref 0–0.4)
ALPHA2 GLOB SERPL ELPH-MCNC: 0.7 G/DL (ref 0.4–1)
B-GLOBULIN SERPL ELPH-MCNC: 1 G/DL (ref 0.7–1.3)
GAMMA GLOB SERPL ELPH-MCNC: 0.9 G/DL (ref 0.4–1.8)
GLOBULIN SER-MCNC: 2.9 G/DL (ref 2.2–3.9)
IGA SERPL-MCNC: 161 MG/DL (ref 87–352)
IGG SERPL-MCNC: 875 MG/DL (ref 586–1602)
IGM SERPL-MCNC: 117 MG/DL (ref 26–217)
INTERPRETATION SERPL IEP-IMP: ABNORMAL
KAPPA LC FREE SER-MCNC: 18.1 MG/L (ref 3.3–19.4)
KAPPA LC FREE/LAMBDA FREE SER: 1.57 {RATIO} (ref 0.26–1.65)
LABORATORY COMMENT REPORT: ABNORMAL
LAMBDA LC FREE SERPL-MCNC: 11.5 MG/L (ref 5.7–26.3)
M PROTEIN SERPL ELPH-MCNC: ABNORMAL G/DL
PROT SERPL-MCNC: 6.6 G/DL (ref 6–8.5)

## 2025-05-08 ENCOUNTER — LAB (OUTPATIENT)
Dept: LAB | Facility: HOSPITAL | Age: 60
End: 2025-05-08
Payer: COMMERCIAL

## 2025-05-12 LAB
ALBUMIN 24H MFR UR ELPH: 31.5 %
ALPHA1 GLOB 24H MFR UR ELPH: 4.5 %
ALPHA2 GLOB 24H MFR UR ELPH: 8.7 %
B-GLOBULIN MFR UR ELPH: 21 %
GAMMA GLOB 24H MFR UR ELPH: 34.2 %
INTERPRETATION UR IFE-IMP: NORMAL
Lab: NORMAL
M PROTEIN 24H MFR UR ELPH: NORMAL %
PROT 24H UR-MRATE: 127 MG/24 HR (ref 30–150)
PROT UR-MCNC: 4.4 MG/DL

## 2025-05-20 NOTE — PROGRESS NOTES
HEMATOLOGY ONCOLOGY OUTPATIENT FOLLOW-UP      Patient name: Betty Dunham  : 1965  MRN: [id number]  Primary Care Physician: Rei Montes De Oca DO  Referring Physician: No ref. provider found  Reason For Consult:       History of Present Illness:    History of Present Illness  Patient is a 60-year-old female who has been referred to us for further evaluation and management of monoclonal gammopathy of undetermined significance.    Outside labs are not available at this time, but as per notes from nephrology, she had ?  Monoclonal lambda light chain on serum immunofixation.    Available recent labs are as follows:    2025:  CBC: 6.4/13.4/42.1/257  BMP: Significant for BUN/creatinine . [GFR 50.1]      She was referred to our care by Dr. Byrnes, a nephrologist, whom she has consulted once approximately 1.5 months ago. During that visit, blood work was conducted which was reportedly concerning for presence of monoclonal protein, but the results are not available at this time.   Over the past 4 to 5 years, her annual check-ups have consistently abnormal  elevated kidney function levels. No history of hypertension, diabetes, cardiac disease, or stroke is reported. There is no history of anemia, and she has never been on any nutritional supplements. A history of histoplasmosis, diagnosed 20 years ago, is noted.    A long-standing history of headaches is reported, dating back to her pregnancy with twins 26 years ago. Regular use of pain medication is not reported, but she was previously advised by her neurologist to take over-the-counter migraine medication, which she used frequently in the past. She has never taken naproxen or diclofenac. Previously on omeprazole, she has since switched to Pepcid as per Dr. Byrnes's recommendation.    PAST SURGICAL HISTORY:  She underwent a total left knee replacement 10 weeks ago and has since made a full functional recovery. A hysterectomy  has also been performed, and she does not undergo Pap smears but maintains regular mammogram screenings.    25: pt seen today for initial evaluation . No acute complaints reported.        Subjective:  25: Here for follow up on lab results. No new complaints today.      Past Medical History:   Diagnosis Date    Arthritis     Migraine     Sleep apnea        Past Surgical History:   Procedure Laterality Date    APPENDECTOMY       SECTION      COLONOSCOPY N/A 2019    Procedure: COLONOSCOPY with polypectomy x2;  Surgeon: Dominic Cameron MD;  Location: [facility] END[facility];  Service: Gastroenterology    HYSTERECTOMY      KNEE ARTHROSCOPY Right     NO PAST SURGERIES      REPLACEMENT TOTAL KNEE      SINUS SURGERY           Current Outpatient Medications:     gabapentin (NEURONTIN) 100 MG capsule, , Disp: , Rfl:     rizatriptan MLT (MAXALT-MLT) 10 MG disintegrating tablet, Place 1 tablet on the tongue 1 (One) Time As Needed for Migraine. May repeat in 2 hr if needed max 2 per day, Disp: 12 tablet, Rfl: 11    Semaglutide-Weight Management 0.25 MG/0.5ML solution auto-injector, Inject 0.5 mL under the skin into the appropriate area as directed 1 (One) Time Per Week., Disp: 2 mL, Rfl: 2    No Known Allergies    Family History   Problem Relation Age of Onset    Parkinsonism Father     Hyperlipidemia Father     Stroke Father     Colon cancer Maternal Grandmother        Cancer-related family history includes Colon cancer in her maternal grandmother.      Social History     Tobacco Use    Smoking status: Former     Current packs/day: 0.00     Average packs/day: 1 pack/day for 13.0 years (13.0 ttl pk-yrs)     Types: Cigarettes     Start date: 1985     Quit date:      Years since quittin.4    Smokeless tobacco: Never    Tobacco comments:     24 yrs ago   Vaping Use    Vaping status: Never Used   Substance Use Topics    Alcohol use: Not Currently     Comment: 3 times per year    Drug use: No     Social  History     Social History Narrative    Not on file       ROS:   Review of Systems   Constitutional: Negative.    HENT: Negative.     Eyes: Negative.    Respiratory: Negative.     Cardiovascular: Negative.    Gastrointestinal: Negative.    Endocrine: Negative.    Genitourinary: Negative.    Musculoskeletal: Negative.    Skin: Negative.    Allergic/Immunologic: Negative.    Neurological: Negative.    Hematological: Negative.    Psychiatric/Behavioral: Negative.           Objective:    Vital Signs:  There were no vitals filed for this visit.    There is no height or weight on file to calculate BMI.    ECOG  (1) Restricted in physically strenuous activity, ambulatory and able to do work of light nature    Physical Exam:   Physical Exam  Constitutional:       Appearance: Normal appearance. She is normal weight.   HENT:      Head: Normocephalic and atraumatic.      Right Ear: External ear normal.      Left Ear: External ear normal.      Nose: Nose normal.      Mouth/Throat:      Mouth: Mucous membranes are moist.      Pharynx: Oropharynx is clear.   Eyes:      Extraocular Movements: Extraocular movements intact.      Conjunctiva/sclera: Conjunctivae normal.      Pupils: Pupils are equal, round, and reactive to light.   Cardiovascular:      Rate and Rhythm: Normal rate.      Pulses: Normal pulses.   Pulmonary:      Effort: Pulmonary effort is normal.   Abdominal:      General: Abdomen is flat.      Palpations: Abdomen is soft.   Musculoskeletal:         General: Normal range of motion.      Cervical back: Normal range of motion and neck supple.   Skin:     General: Skin is warm.   Neurological:      Mental Status: She is alert.   Psychiatric:         Mood and Affect: Mood normal.         Behavior: Behavior normal.         Thought Content: Thought content normal.         Judgment: Judgment normal.         Lab Results - Last 18 Months   Lab Units 04/23/25  1009 01/08/25  1307 04/09/24  1630   WBC 10*3/mm3 4.88 6.46 8.50  "  HEMOGLOBIN g/dL 12.9 13.4 13.9   HEMATOCRIT % 39.2 42.1 41.9   PLATELETS 10*3/mm3 202 257 257   MCV fL 92.0 91.9 89.9     Lab Results - Last 18 Months   Lab Units 04/23/25  1009 01/08/25  1307 12/12/24  1313 04/09/24  1630   SODIUM mmol/L 137 142 143 138   POTASSIUM mmol/L 4.4 4.5 4.4 4.7   CHLORIDE mmol/L 103 104 104 103   CO2 mmol/L 25.8 27.0 23.5 25.0   BUN mg/dL 18 20 21 19   CREATININE mg/dL 1.18* 1.10* 1.23* 1.37*   CALCIUM mg/dL 9.8 9.2 9.3 9.2   BILIRUBIN mg/dL 0.4  --   --  0.3   ALK PHOS U/L 100  --   --  123*   ALT (SGPT) U/L 36*  --   --  17   AST (SGOT) U/L 32  --   --  14   GLUCOSE mg/dL 95 92 87 88       Lab Results   Component Value Date    GLUCOSE 95 04/23/2025    BUN 18 04/23/2025    CREATININE 1.18 (H) 04/23/2025    EGFRIFNONA 45 (L) 07/21/2020    EGFRIFAFRI 55 (L) 03/09/2017    BCR 15.3 04/23/2025    K 4.4 04/23/2025    CO2 25.8 04/23/2025    CALCIUM 9.8 04/23/2025    ALBUMIN 4.4 04/23/2025    ALBUMIN 3.7 04/23/2025    AST 32 04/23/2025    ALT 36 (H) 04/23/2025       No results for input(s): \"APTT\", \"INR\", \"PTT\" in the last 41956 hours.    No results found for: \"IRON\", \"TIBC\", \"FERRITIN\"    Lab Results   Component Value Date    FOLATE 14.20 04/23/2025       No results found for: \"OCCULTBLD\"    Lab Results   Component Value Date    RETICCTPCT 1.76 04/23/2025     Lab Results   Component Value Date    USZPBSJA81 799 04/23/2025     No results found for: \"SPEP\", \"UPEP\"  LDH   Date Value Ref Range Status   04/23/2025 185 135 - 214 U/L Final     No results found for: \"PAT\", \"RF\", \"SEDRATE\"  No results found for: \"FIBRINOGEN\", \"HAPTOGLOBIN\"  No results found for: \"PTT\", \"INR\"  No results found for: \"\"  No results found for: \"CEA\"  No components found for: \"CA-19-9\"  No results found for: \"PSA\"  No results found for: \"SEDRATE\"       Assessment & Plan       IgM Lambda MGUS:  -outside records reviewed, noted concern for Monoclonal protein/paraproteinemia, however lab results are not available.  -We " discussed pathophysiology of Plasma cell disorders and various diseases on this spectrum including MGUS, Smoldering MM, Myeloma, AL Amyloidosis and POEMS syndrome etc  -I explained to the patient that most patients with MGUS do not progress to Myeloma with median risk of progression around 1-2% however this diagnosis calls for routine surveillance.  -I Also explained to the patient the clinical/lab signs associated with Multiple Myeloma/Amyloidosis.   -noted presence of Stage III CKD without any obvious underlying risk factors/medical comorbidities.Familial predisposition and long-term medication use could be the potential contributors.   -CBC and CMP on outside labs are unremarkable with no concern for advanced disease at this time.  -Repeat CBC is WNL, CMP showed Stage IIIA CKD (stable),   Plasma cell disorder workup showed unquantifiable IgM Lambda MONoclonal protein. Normal FLC levels and Ig levels.. Noted negative Urine JAYLAN/Protein Electrophoresis  -Discussed with patient that she has very low risk disease and is not concerning for progression to MM/WM at this time. Renal function impairment is not  Due to MGUS/plasma cell disorder.    2. Chronic headaches.  A long-standing history of headaches, dating back to pregnancy with twins 26 years ago. No regular use of pain medication, but previously advised by a neurologist to take over-the-counter migraine medication, which was used frequently in the past.    3. CKD Stage IIIA:  -noted presence of Stage III CKD without any obvious underlying risk factors/medical comorbidities.Familial predisposition and long-term medication use could be the potential contributors.     Follow-up  Follow-up in 6 months with repeat labs, sooner as needed.     Thank you very much for providing the opportunity to participate in this patient’s care. Please do not hesitate to call if there are any other questions.

## 2025-05-21 ENCOUNTER — OFFICE VISIT (OUTPATIENT)
Dept: ONCOLOGY | Facility: CLINIC | Age: 60
End: 2025-05-21
Payer: COMMERCIAL

## 2025-05-21 VITALS
HEART RATE: 59 BPM | SYSTOLIC BLOOD PRESSURE: 129 MMHG | BODY MASS INDEX: 27.87 KG/M2 | HEIGHT: 66 IN | WEIGHT: 173.4 LBS | DIASTOLIC BLOOD PRESSURE: 73 MMHG | OXYGEN SATURATION: 99 %

## 2025-05-21 DIAGNOSIS — N18.31 STAGE 3A CHRONIC KIDNEY DISEASE: ICD-10-CM

## 2025-05-21 DIAGNOSIS — D47.2 MGUS (MONOCLONAL GAMMOPATHY OF UNKNOWN SIGNIFICANCE): Primary | ICD-10-CM

## 2025-05-21 PROCEDURE — 99214 OFFICE O/P EST MOD 30 MIN: CPT | Performed by: STUDENT IN AN ORGANIZED HEALTH CARE EDUCATION/TRAINING PROGRAM

## 2025-05-27 DIAGNOSIS — Z12.31 SCREENING MAMMOGRAM FOR BREAST CANCER: ICD-10-CM

## 2025-05-28 ENCOUNTER — TELEPHONE (OUTPATIENT)
Dept: FAMILY MEDICINE CLINIC | Facility: CLINIC | Age: 60
End: 2025-05-28
Payer: COMMERCIAL

## 2025-05-28 NOTE — TELEPHONE ENCOUNTER
Patient asking for refill of Tirzepatide. She has been using the semaglutide and would like to switch back. [facility].

## 2025-05-28 NOTE — TELEPHONE ENCOUNTER
Spoke with Betty Dunham, gave them message in the chart, verbalizes understanding. Upcoming Appts  With Family Medicine (Rei Montes De Oca DO)  12/15/2025 at 8:00 AM

## 2025-05-30 ENCOUNTER — OFFICE VISIT (OUTPATIENT)
Dept: FAMILY MEDICINE CLINIC | Facility: CLINIC | Age: 60
End: 2025-05-30
Payer: COMMERCIAL

## 2025-05-30 VITALS
OXYGEN SATURATION: 98 % | DIASTOLIC BLOOD PRESSURE: 70 MMHG | HEART RATE: 72 BPM | WEIGHT: 172.6 LBS | SYSTOLIC BLOOD PRESSURE: 108 MMHG | HEIGHT: 66 IN | BODY MASS INDEX: 27.74 KG/M2

## 2025-05-30 DIAGNOSIS — N18.31 STAGE 3A CHRONIC KIDNEY DISEASE: ICD-10-CM

## 2025-05-30 DIAGNOSIS — E66.3 OVERWEIGHT (BMI 25.0-29.9): Primary | ICD-10-CM

## 2025-05-30 PROBLEM — D47.2 MGUS (MONOCLONAL GAMMOPATHY OF UNKNOWN SIGNIFICANCE): Status: ACTIVE | Noted: 2025-05-30

## 2025-05-30 PROBLEM — E66.811 OBESITY (BMI 30.0-34.9): Status: RESOLVED | Noted: 2024-04-10 | Resolved: 2025-05-30

## 2025-05-30 RX ORDER — SEMAGLUTIDE 0.68 MG/ML
0.25 INJECTION, SOLUTION SUBCUTANEOUS WEEKLY
Qty: 3 ML | Refills: 0 | Status: SHIPPED | OUTPATIENT
Start: 2025-05-30

## 2025-05-30 NOTE — PROGRESS NOTES
"Chief Complaint  Chief Complaint   Patient presents with    Med Refill     Weight loss medication       Subjective        Betty Dunham is a 60 y.o. female who presents to [facility].  History of Present Illness    Betty is a 60-year-old female here for multiple concerns.       CKD  Was seen by nephrology in March for CKD of unclear etiology  At that time labs were done which were grossly insignificant outside of abnormal serum JAYLAN.  She was referred to hematology and diagnosed with MGUS.  Hematologist did not feel her MGUS was related to her CKD.  Ultimately, her nephrologist believes her CKD is related to chronic NSAID use.  However, patient declines history of regular NSAID use and does not believe that is the cause of her CKD.        Weight  Has had limited exercise recently due to knee replacement in February.  Has still been going on regular walks.  Her diet has been improving for the last several months.  Previously was a heavy snacker however has cut that out.  Only eats 2 meals per day -states they are typically home-cooked with healthy food options.  She was previously on compounded tirzepatide, however switched to compounded semaglutide due to lack of tirzepatide availability.  She has 1 dose left of the semaglutide and is interested in continuing on weight loss medication.        Objective   /70   Pulse 72   Ht 166.4 cm (65.5\")   Wt 78.3 kg (172 lb 9.6 oz)   SpO2 98%   BMI 28.29 kg/m²     Estimated body mass index is 28.29 kg/m² as calculated from the following:    Height as of this encounter: 166.4 cm (65.5\").    Weight as of this encounter: 78.3 kg (172 lb 9.6 oz).     Physical Exam   GEN: In no acute distress, non toxic appearing  HEENT: MMM. EOMI.   CV: No extremity edema.   RESP: No signs of respiratory distress.  SKIN: No rashes  MSK: No deformity.   NEURO: Moves all extremities equally. Alert and appropriate           Result Review :            "   Assessment and Plan     Diagnoses and all orders for this visit:    1. Overweight (BMI 25.0-29.9) (Primary)    Reviewed importance of healthy diet/exercise in order to achieve and sustain long-term weight loss.  Patient is interested in continuing weight loss medication -recommend that she contact her insurance company and see which medications may be covered for weight loss.  In the meantime, we will finish her last dose of compounded semaglutide and then discontinue.    Discussed option for short-term phentermine as this is usually cheap, however patient has been on this before and did not tolerate.    Also mentioned option for paying for Wegovy out-of-pocket at $500/month which she will consider.      2. Stage 3a chronic kidney disease  Workup with nephrology grossly unremarkable outside of abnormal JAYLAN.  Hematologist does not feel like MGUS is related to her CKD.   Given her normal workup I do not see any obvious etiology for her CKD.  Patient did mention the possibility of autoimmune disease, however this is unlikely given lack of signs/symptoms, normal CBC, and normal urinalysis.  I did offer to obtain some basic autoimmune testing if she would like.         I spent 42 minutes caring for Betty on this date of service. This time includes time spent by me in the following activities:preparing for the visit, reviewing tests, obtaining and/or reviewing a separately obtained history, performing a medically appropriate examination and/or evaluation , counseling and educating the patient/family/caregiver, documenting information in the medical record, and independently interpreting results and communicating that information with the patient/family/caregiver  Follow Up     Return for Next scheduled follow up.

## (undated) DEVICE — SNAR POLYP CAPTIVATOR HEX 27MM 240CM

## (undated) DEVICE — TRAP WIDEEYE POLYP

## (undated) DEVICE — PAPR PRNT PK SONY W RIBN UPC55

## (undated) DEVICE — PK ENDO GI 50